# Patient Record
Sex: FEMALE | Race: WHITE | NOT HISPANIC OR LATINO | Employment: OTHER | URBAN - METROPOLITAN AREA
[De-identification: names, ages, dates, MRNs, and addresses within clinical notes are randomized per-mention and may not be internally consistent; named-entity substitution may affect disease eponyms.]

---

## 2017-01-12 ENCOUNTER — OFFICE VISIT (OUTPATIENT)
Dept: SURGERY | Facility: CLINIC | Age: 62
End: 2017-01-12

## 2017-01-12 VITALS
BODY MASS INDEX: 29.38 KG/M2 | OXYGEN SATURATION: 94 % | HEIGHT: 66 IN | TEMPERATURE: 98.5 F | HEART RATE: 89 BPM | DIASTOLIC BLOOD PRESSURE: 65 MMHG | SYSTOLIC BLOOD PRESSURE: 111 MMHG | WEIGHT: 182.8 LBS

## 2017-01-12 DIAGNOSIS — Z80.3 FH: BREAST CANCER: ICD-10-CM

## 2017-01-12 DIAGNOSIS — N60.19 DIFFUSE CYSTIC MASTOPATHY, UNSPECIFIED LATERALITY: ICD-10-CM

## 2017-01-12 DIAGNOSIS — D24.9 PAPILLOMA OF BREAST, UNSPECIFIED LATERALITY: ICD-10-CM

## 2017-01-12 DIAGNOSIS — R92.1 BREAST CALCIFICATIONS ON MAMMOGRAM: Primary | ICD-10-CM

## 2017-01-12 PROCEDURE — 99212 OFFICE O/P EST SF 10 MIN: CPT | Performed by: SURGERY

## 2017-01-12 NOTE — MR AVS SNAPSHOT
Kathreyn Andersen   1/12/2017 11:00 AM   Office Visit    Dept Phone:  [phone]   Encounter #:  [id number]    Provider:  Elba Urban MD   Department:  [facility] BREAST SURGERY                Your Full Care Plan              Your Updated Medication List          This list is accurate as of: 1/12/17 11:31 AM.  Always use your most recent med list.                ALPRAZolam 0.5 MG tablet   Commonly known as:  XANAX   take 1 tablet by mouth at bedtime       aspirin 81 MG tablet       azithromycin 250 MG tablet   Commonly known as:  ZITHROMAX Z-ADELINE   Take 2 tablets the first day, then 1 tablet daily for 4 days.       escitalopram 20 MG tablet   Commonly known as:  LEXAPRO   take 1 tablet by mouth once daily       ESTROGEL TD       FLONASE ALLERGY RELIEF 50 MCG/ACT nasal spray   Generic drug:  fluticasone       gabapentin 100 MG capsule   Commonly known as:  NEURONTIN   Take 2 capsules by mouth every night.       ibuprofen 400 MG tablet   Commonly known as:  ADVIL,MOTRIN       lisinopril-hydrochlorothiazide 10-12.5 MG per tablet   Commonly known as:  PRINZIDE,ZESTORETIC   take 1 tablet by mouth once daily       oxyCODONE-acetaminophen 5-325 MG per tablet   Commonly known as:  PERCOCET       promethazine-dextromethorphan 6.25-15 MG/5ML syrup   Commonly known as:  PROMETHAZINE-DM   Take 5 mL by mouth 4 (four) times a day as needed for cough.       simvastatin 40 MG tablet   Commonly known as:  ZOCOR   take 1 tablet by mouth at bedtime       traMADol 50 MG tablet   Commonly known as:  ULTRAM       traZODone 100 MG tablet   Commonly known as:  DESYREL   Take 1 tablet by mouth Every Night.       zolpidem 5 MG tablet   Commonly known as:  AMBIEN   take 1 tablet by mouth at bedtime if needed for sleep               You Were Diagnosed With        Codes Comments    Breast calcifications on mammogram    -  Primary ICD-10-CM: R92.1  ICD-9-CM: 793.89     Diffuse cystic mastopathy,  "unspecified laterality     ICD-10-CM: N60.19  ICD-9-CM: 610.1     Papilloma of breast, unspecified laterality     ICD-10-CM: D24.9  ICD-9-CM: 217     FH: breast cancer     ICD-10-CM: Z80.3  ICD-9-CM: V16.3       Instructions     None    Patient Instructions History      Upcoming Appointments     Visit Type Date Time Department    OFFICE VISIT 2017 11:00 AM [facility]    OFFICE VISIT 2017 10:30 AM [facility] Signup     [facility] [email or web address] allows you to send messages to your doctor, view your test results, renew your prescriptions, schedule appointments, and more. To sign up, go to [employer] and click on the Sign Up Now link in the New User? box. Enter your [email or web address] Activation Code exactly as it appears below along with the last four digits of your Social Security Number and your Date of Birth () to complete the sign-up process. If you do not sign up before the expiration date, you must request a new code.    [email or web address] Activation Code: [email or web address]  Expires: 2017 11:31 AM    If you have questions, you can email [email or web address] or call [phone] to talk to our [email or web address] staff. Remember, [email or web address] is NOT to be used for urgent needs. For medical emergencies, dial 911.               Other Info from Your Visit           Your Appointments     2017 10:30 AM EDT   Office Visit with Marcello Mills MD   [facility] (--)    [address]  [facility] [address] [phone]           Arrive 15 minutes prior to appointment.              Allergies     Other        Reason for Visit     Follow-up           Vital Signs     Blood Pressure Pulse Temperature Height    111/65 (BP Location: Left arm, Patient Position: Sitting, Cuff Size: Adult) 89 98.5 °F (36.9 °C) (Temporal Artery ) 66\" (167.6 cm)    Weight Oxygen Saturation Body Mass Index Smoking Status    182 lb 12.8 oz (82.9 kg) 94% 29.5 kg/m2 Never " Smoker      Problems and Diagnoses Noted     Breast calcification seen on mammogram    Painful lumpy breasts    FH: breast cancer    Benign tumor of breast

## 2017-01-12 NOTE — PROGRESS NOTES
Chief Complaint: Katheryn Andersen is a 61 y.o. female who was seen in consultation at the request of Armando Powell MD  for a followup visit    History of Present Illness:   Ms Andersen began having bilateral nipple discharge about one year ago.  She describes this as bilateral ,spontaneous, occurring intermittently, about once every 1-2 months.  SHe describes that the right discharge was clear or brown, and that the left was initially brown but then became frankly bloody.  She stopped her premarin about 4 weeks before her breast MRI.  SHe has had hot flashes since. She denies any masses, skin  changes, or nipple chages other than the discharge in either breast.  She denies any lumps in either axilla or in her neck.  Her  imaging after presentation included a bilateral screening mammogram 2/3/9 at [facility] that showed stable punctate calcifications with a diffuse scattered pattern, BR2.   Due to the nipple discharge, she then underwent a bilateral breast MRI 12/2/9 at [facility].  This showed in the LEFT breast a group of linear, clumped enhancement within the RA region (about 2.2 cm posterior to the nipple), spanning 2.5 cm diameter with no mammographic correlate. On the RIGHT, the MRI showed at 12:00 an about 1.4cm mass, 5CFN, superior to the nipple- resmbling a FA, but with some above threshold enhancement and rapid washout with recs for US. SHe had that ultrasound performed, which showed at the 12:00 4 CFN location a 9x5mm solid lesion, BR3.   SHe then undernwent an MRI guided biopsy 12/16/9 LEFT breast, the path from which returned as florid hyperplasia, papillomas, focal sclerosis, possibly representing a radial sclerosing lesion,and was felt to be concordant.  Her post biopsy mammogram showed a biopsy in the RA region. We then identified the RIGHT breast 12:00 lesion 5CFN on ultrasound and perfomred an US-guided breast biopsy of the solid lesion at the RIGHT12:00 location that returned as a  fibroadenoma.  2/4/10 I took Ms Andersen to the operating room for a needle localized excisional biopsy of the LEFT breast RA papilloma and radial sclerosing lesion.  The pathology from this returned as papilloma, usual hyperplasia, columnar cell change without atypia, sclerosing adenosis, focal radial scar, and microcysts.  She is here for her followup with no complaints except the expected discomfort at her incision. She denies any redness, warmth, or drainage from her wound.  She has a FH significant for colon cancer (see details below)-- she does have one maternal cousin with breast cancer diagnosed at age 36, who  from her breast cancer.  Mrs. Andersen postoperative bilateral mammogram was read as a BR2, with post operative changes on the LEFT and negative for findings on the RIGHT.      Mrs. Andersen's father passed away from compliactions of COPD. Her daugher who has Crohns disease is stable and is trying to get pregnant, and she has been trying to get her mother into a patio home now that her father has passed.    Mrs. Andersen mammogram from 3-15-11showed interval development of calcifications 6:00 RIGHT middle third slightly retroareolar location, rec stereo. LEFT breast no abnormal findings. I cancelled her 3-22 followup and scheduled her for a stereo biopsy in the interim with post biopsy visit. She is here today for that procedure.    Mrs. Andersen has done well since her RIGHT stereo biopsy on 11. She did have some post procedure bruising, but this is resolving.  She recently returned from [facility] from a vacation.   Her pathology returned as duct hyperplasia without atypia, CCC, apocrine metaplasia, focal adenosis, and microcyst.  Mrs. Andersen has been doing well since we last visited. No new masses, skin changes, nipple discharge either breast. She had reported RIGHT discharge in the past that was umpredictable, and infrequent, nonbloody. She has noted no further in the past 6 months.  Her most recent  imaging includes a 6 mo FU from RIGHT biopsy for benign disease:    [id number] Right Digital Diagnostic Mammography  Impression: Benign postbiopsy findings of the right breast.  BIRADS CATEGORY 2: Benign    Since our last visit, Katheryn tells me that she has started having RIGHT nipple drainage again. This time spontaneous. She has noted it 3 x in her bra, each time clear.   She has had no additional symtoms- no masses, skin changes in either breast and no drainage LEFT nipple.  Her most recent imaging includes   03-05-12 Screening mammogram bilateral E Katheryn Andersen  Impression: Stable benign mammogram.   BIRADS Category 1    She has had a significant weight gain over the past year- ~ 20 #.    Since our last visit, Katheryn had a needle biopsy of the RIGHT RA lesion that returned as papilloma, see below. She had moderate to significant bruising from that procedure.    On 5-31-12, I excised a papilloma RIGHT breast.  She has done well since that procedure and has note dno sugns of infection. Her pathology returned as papilloma and 2 radial sclerosing lesions as well as FCC and microcysts, see below.    Since our last visit, Mrs. Andersen has done well. She has noted no changes in her exam. No new masses, skin changes, nipple changes, nipple discharge .  Her most recent imaging includes  09-25-12         Right Breast Sonography          [facility]         Katheryn Andersen  Findings:  Sonographic evaluation of area of surgical excision of the papilla was performed.  corresponds  to the 12:00 o'clock  position  retroareolar.  At this location there is a 1. 5 x 1. 4 x 0. 9 cm  postoperative fluid collection.  BIRADS  Category  2:   Benign    She has started an a [place] diet to lose weight. Her weight today is stable from her last visit, at 180#. Her daughter is planning to start IVF soon.    Since our last visit, Mrs. Andersen has done well.   She has had no further drainage from either nipple.  No new  masses, skin changes, niple changes either breast.  Her daughter failed 2 tries with IUI and then her first trial of IVF.    Her most recent imaging includes the followin13      Digital Screening Mammogram     Katheryn Foreman     Stable fibroglandular pattern.   Scattered fibroglandular densities.   CONCLUSION: BIRADS CATEGORY 2: Benign     In the interim, Mrs. Andersen has done well.  She has noted no changes in her breast exam. No new masses, skin changes, niplpe changes, nipple discharge either breast.  Her most recenti maging includes the followin14      [facility]       Bilateral Mammography         Katheryn Andersen   There are scattered fibroglandular densities.   Finding 1: Stable punctate calcifications both breasts.   Finding 2: Isodense focal asymmetry left breast at 9 o'clock 7 centimeters from the nipple.   IMPRESSION:  Finding 1: Benign Negative   Finding 2:   BIRADS Category 0    14       [facility]       Left Diag Mammogram with Tomosynthesis        Katheryn Andersen   There are scatted fibroglandular densities.   Finding 1: Additional evaluation was performed for the focal asymmetry. Isodense focal asymmetry in the left breast at 10 o'clock located 7 centimeters from the nipple.   LEFT BREAST ULTRASOUND:  Finding 1: Oval elongated complex cyst 13 millimeters.   Finding 2: Oval elongated complicated cyst versus solid mass with circumscribed margins measuring 6 millimeters seen in the left breast at 9 o'clock.   IMPRESSION:   Finding 1: Complex cyst in the left breast is suspicious.   Finding 2: Complicated cyst versus solid mass in the left breast at 9 o'clock is suspicious.   BIRADS category 4A: Suspicious Abnormality    SHe then had the following biopsy at [facility]-    14       [facility]        Left Ultrasound Guided Biopsy          Katheryn Andersen   9:00 left breast.   12 gauge core needle device.   A total of 7 cores.   Minicork shaped s-francesca tissue marker was placed at the biopsy  site.   ADDENDUM-  9:00 left breast has returned intraductal papilloma.   The lesion appeared to be completely removed with the needle biopsy. Pathology is concordant.   Post clip mammogram demonstrates good position of the mini cork shaped clip. It is surrounded by a 2.2 cm mass consistent with a hematoma.     14       [facility]           Pathology Results             Katheryn Andersen   Diagnosis:   Intraductal papilloma with florid intraductal hyperplasia of the usual type.   Fibrofatty breast tissue also showing columnar cell hyperplasia, cystically dilated and ectatic ducts, apocrine metaplasia, and patchy mild chronic stromal inflammation.     She has her first new grandson, Tashi, who is 4 months. Her daughter had postpartum cardiomyopathy and has EF < 30% and has a defibrillator.     In the interim,  Mrs. Andersen  has done well.  She has noted no changes in her breast exam. No new masses, skin changes,  nipple changes, nipple discharge either breast.     Her most recent imaging includes the followin14          [facility]          LEFT BREAST ULTRASOUND           Katheryn Andersen  Finding 1: follow up focal asymmetry in the left breast, 10 o'clock seen is no evidence of any solid mass or abnormal cystic elements.   Finding 2: Followup complicated cyst versus solid mass left breast, 9 o'clock. 6 millimeters in the left breast at 9 o'clock. Mass is vascular. Does not persist.   Finding 3: There are two simple cysts seen in the left breast.   IMPRESSION:  Finding 1: Benign Negative  Finding 2: Benign Negative   Finding 3: Benign Negative   BIRADS Category 2: Benign     We ordered a LEFT mammogram, and we called [facility] when we received the report of an US. [facility] felt that an US was more appropriate- pt confirmed this today. Therefore, I did not have her return for mammogram.  She has stopped her estrace.      In the interim,  Mrs. Anedrsen  has done well.  She has noted no changes in her breast exam. No new masses, skin  changes,  nipple changes, nipple discharge either breast.     Her most recent imaging includes the followin/09/15         [facility]         Bilateral Screening Mammogram with Tomosynthesis          Katheryn Andersen  Yearly screening mammogram.  The breasts are heterogeneously dense.  There are new punctate calcifications with grouped distribution seen in the right breast at 5 o'clock.  IMPRESSION:  Birads Category 0    04/14/15          [facility]            RIGHT DIAGNOSTIC MAMMOGRAPHY         Katheryn Andersen  The breast is heterogeneously dense.   Finding 1: Additional evaluation calcifications in the right breast, 5 o'clock 9 millimeters with clustered distribution in the anterior one-third region of the right breast at 5 o'clock. Increased number of calcifications.   Finding 2: Multiple punctate and fine granular calcifications measuring 9 millimeters clustered middle one-third lower inner region of the right breast.   Finding 3: Multiple fine granular calcifications 9 millimeters clustered anterior one-third upper outer region of the right breast.   IMPRESSION;   Finding 1: Calcifications anterior one-third region right breast at 5 o'clock are suspicious.   Finding 2: Calcification middle one-third lower inner region right breast are suspicious.   Finding 3: Anterior one-third upper outer region of the right breast are probably benign. Follow up mammography in 6 months.   BIRADS category 4A    She then had the following biopsies:    04/16/15        [facility]                 Pathology report                   katheryn Andersen   DIAGNOSIS  1. Right breast tissue, 5:00, stereotactic biopsy;  benign usual duct hyperplasia and columnar cell hyperplasia with associated microcalcifications; Intraluminal microcalcifications; Aprocrine meta plasia; and cystically dilated and ectatic ducts.  2. Right breast tissue, lower inner quadrant, stereotactic biopsy:  benign columnar cell hyperplasia with associated microcalcifications; usual duct  "hyperplasia; sclerosing adenose with microcalcifications; ductal dilatation and ectasia; and apocrine metaplasia.     04/16/15         [facility]        Stereotactic Biopsy          Katheryn Andersen  12 core needle biopys specimens a 9 guage vacuum assisted device. A(n) hourglass shaped tri-francesca tissue marker was deplayed within the biopsy bed.  A follow up mammogram in 6 months is recommended.  ADDENDUM:  Right 6 month follow-up mammogram is recommended. Pathology is concordant.   Post-clip mammogram demonstrates good postition of the \"hour-glass\" shaped clip. The \"hour-glass\" shapped clip is the most anterior and medial. No significant hemotoma formation.     04/16/15        [facility]        Stereotactic Biopsy          Katheryn Andersen  Right breast, 12 core needle biopsy specimens a 9 gauge vacuum assisted device. A top hat shaped s-francesca eviva tissue marker was deployed within the biopsy bed.   A follow up mammogram in 6 month is recommended.   ADDENDUM:  Pathology is concordant. The clip from this biopsy is a \"top-hat\" shaped clip. The \"top-hat\" shaped clip is deep to the nipple and the most posterior of the four clips. \"Top-hat\" shaped clip is the most posterior and the most inferior of the four clips.    She reports bruising RIGHT breast.      In the interim,  Mrs. Andersen has done well.  She has noted no changes in her breast exam. No new masses, skin changes,  nipple changes, nipple discharge either breast.       Her most recent imaging includes the following:  10/14/15        [facility]     Right Breast Diagnostic Mammogram with Tomosynthesis                              Katheryn Andersen  The breast is heterogeneously dense.  Finding 1:  Follow-up calcifications in the right breast,5 o'clock.   in an area of prior needle biopsy. a decreased number of calcifications.  Finding 2:  Follow-up calcifications in the right breast,lower inner   few stable punctate and very faint calcifications in the middle one-third " lower inner region of the right breast..  in an area of prior needle biopsy. decreased number of calcifications.  Finding 3:  Follow-up  calcifications in the right breast, upper outer. grouped distribution in the anterior one-third upper outer region of the right breast.  These demonstrate morphology similar to the groups which were biopsied.  There has been no significant change.  IMPRESSION:  Finding 1:  Benign-negative.  Finding 2:  Benign-negative  Finding 3:  Stable punctate calcifications in the anterior one-third upper outer region of the right breast are probably benign.  A 6 month follow-up diagnostic mammogram is recommended.  BI-RADS Category 3- Probably benign    She has gained 7#, at 185 today.    In the interim,  Katheryn Andersen  has done well.  She has noted no changes in her breast exam. No new masses, skin changes, nipple changes, nipple discharge either breast.     Her most recent imaging includes the following:  [facility].  The breast are heterogenous leg dense.  Follow-up right upper outer breast calcifications seen in the anterior to posterior third, no suspicious forms, no change.  Stable calcifications central right breast.  BI-RADS 3 for 6 month mammogram.      Interval History:  In the interim,  Katheryn Andersen  has done well.  She has noted no changes in her breast exam. No new masses, skin changes, nipple changes, nipple discharge either breast.   She denies headache, bone pain, belly pain, cough, changes in vision or gait.  Her most recent imaging includes the following:  [facility] October [facility] 25th 2016.  Right diagnostic mammogram with 3-D.  Follow up calcifications in the right breast, upper outer.  No suspicious forms.  No significant change.  Right breast ultrasound was benign.  BI-RADS 2.      Review of Systems:  Review of Systems   Constitutional: Negative for unexpected weight change (7 lb weight  loss).   All other systems reviewed and are negative.       Past Medical and Surgical History:  Breast Biopsy History:  Patient has had the following breast biopsies:She had one on the LEFT side 12/16/9. The pathology from this biopsy was papilloma, radial sclerosing lesion.  Breast Cancer HIstory:  Patient does not have a past medical history of breast cancer.  Breast Operations, and year:  None   Obstetric/Gynecologic History:  Age menstrual periods began:13  Patient is postmenopausal due to removal of her uterus in the following year: 2005   Number of pregnancies:2  Number of live births: 2  Number of abortions or miscarriages: 0  Age of delivery of first child: 28  Patient did not breast feed.  Length of time taking birth control pills:12 yrs   Patient took hormone replacement during the following dates: 3 yrs     Past Surgical History   Procedure Laterality Date   • Hysterectomy     • Foot arthroplasty     • Refractive surgery         Past Medical History   Diagnosis Date   • Hyperlipidemia    • Hypertension    • Insomnia        Prior Hospitalizations, other than for surgery or childbirth, and year:  n/a      Social History     Social History   • Marital status: Single     Spouse name: N/A   • Number of children: N/A   • Years of education: N/A     Occupational History   • Not on file.     Social History Main Topics   • Smoking status: Never Smoker   • Smokeless tobacco: Never Used   • Alcohol use Yes      Comment: social   • Drug use: No   • Sexual activity: Defer     Other Topics Concern   • Not on file     Social History Narrative     Patient is .  Patient is a homemaker.  Patient drinks 1 servings of caffeine per day.      Family History:  Family History   Problem Relation Age of Onset   • Diabetes Mother    • Hypertension Mother    • Hyperlipidemia Mother    • COPD Father    • Hypertension Father    • Hyperlipidemia Father    • Diabetes Sister    • Mental retardation Sister    • Stroke Maternal  "Grandmother    • Stroke Paternal Grandmother    • Diabetes Paternal Grandmother    • Breast cancer Cousin 36     MATERNAL 1ST COUSIN    • Colon cancer Maternal Uncle      70S   • Colon cancer Maternal Uncle      70S   • Colon cancer Cousin      MATERNAL COUSIN   • Colon cancer Cousin      MATERNAL COUSIN   • Colon cancer Cousin      MATERNAL COUSIN       Vital Signs:  Visit Vitals   • /65 (BP Location: Left arm, Patient Position: Sitting, Cuff Size: Adult)   • Pulse 89   • Temp 98.5 °F (36.9 °C) (Temporal Artery )   • Ht 66\" (167.6 cm)   • Wt 182 lb 12.8 oz (82.9 kg)   • SpO2 94%   • BMI 29.5 kg/m2        Medications:    Current Outpatient Prescriptions:   •  ALPRAZolam (XANAX) 0.5 MG tablet, take 1 tablet by mouth at bedtime, Disp: 30 tablet, Rfl: 0  •  aspirin 81 MG tablet, Take by mouth daily., Disp: , Rfl:   •  escitalopram (LEXAPRO) 20 MG tablet, take 1 tablet by mouth once daily, Disp: 30 tablet, Rfl: 5  •  Estradiol (ESTROGEL TD), Place  on the skin., Disp: , Rfl:   •  FLONASE ALLERGY RELIEF 50 MCG/ACT nasal spray, instill 2 sprays into each nostril once daily, Disp: , Rfl: 0  •  gabapentin (NEURONTIN) 100 MG capsule, Take 2 capsules by mouth every night., Disp: 60 capsule, Rfl: 1  •  ibuprofen (ADVIL,MOTRIN) 400 MG tablet, , Disp: , Rfl: 0  •  lisinopril-hydrochlorothiazide (PRINZIDE,ZESTORETIC) 10-12.5 MG per tablet, take 1 tablet by mouth once daily, Disp: 30 tablet, Rfl: 5  •  simvastatin (ZOCOR) 40 MG tablet, take 1 tablet by mouth at bedtime, Disp: 30 tablet, Rfl: 5  •  traZODone (DESYREL) 100 MG tablet, Take 1 tablet by mouth Every Night., Disp: 90 tablet, Rfl: 1  •  zolpidem (AMBIEN) 5 MG tablet, take 1 tablet by mouth at bedtime if needed for sleep, Disp: 30 tablet, Rfl: 3  •  azithromycin (ZITHROMAX Z-ADELINE) 250 MG tablet, Take 2 tablets the first day, then 1 tablet daily for 4 days., Disp: 6 tablet, Rfl: 0  •  oxyCODONE-acetaminophen (PERCOCET) 5-325 MG per tablet, take 1 to 2 tablets by mouth " every 4 hours if needed for pain, Disp: , Rfl: 0  •  promethazine-dextromethorphan (PROMETHAZINE-DM) 6.25-15 MG/5ML syrup, Take 5 mL by mouth 4 (four) times a day as needed for cough., Disp: 180 mL, Rfl: 0  •  traMADol (ULTRAM) 50 MG tablet, take 1 to 2 tablets by mouth every 6 hours if needed for pain, Disp: , Rfl: 0     Allergies:  Allergies   Allergen Reactions   • Other        Physical Examination:  General Appearance:  Patient is in no distress.  She is well kept and has an  overweight  build.   Psychiatric:  Patient with appropriate mood and affect. Alert and oriented to self, time, and place.    Breast, RIGHT:  medium  sized, symmetric with the contralateral side.  Breast skin is without erythema, edema, rashes bilaterally.  There are no visible abnormalities upon inspection during the arm-raising maneuver or with hands on hips in the sitting position. There is no nipple retraction or nipple/areolar skin changes. There is a well healed periareolar incision 12:00 and UIQ from 5-2012 excision of papilloma on core. There are no masses palpable in the sitting or supine positions.      Breast, LEFT:   medium  sized, symmetric with the contralateral side.  Breast skin is without erythema, edema, rashes bilaterally.  She has a well healed curvilinear incision at the upper inner periareolar location 9-12:00.  There is no nipple retraction, no discharge or nipple/areolar skin changes.  There are no masses palpable in the sitting or supine positions.     Lymphatic:  There is no axillary, cervical, infraclavicular, or supraclavicular adenopathy bilaterally.  Eyes:  Pupils are round and reactive to light.  Cardiovascular:  Heart rate and rhythm are regular.  Respiratory:  Lungs are clear bilaterally with no crackles or wheezes in any lung field.  Gastrointestinal:  Abdomen is soft, nondistended, and nontender. There are no scars from previous surgery.   Musculoskeletal:  Good strength in all 4 extremities.  There is good  range of motion in both shoulders.   Skin:  No new skin lesions or rashes on the skin excluding the breast (see breast exam above).          Imaging:   3-15-11[facility] mammogram bilateral- showed interval development of calcifications 6:00 RIGHT middle third slightly retroareolar location, rec stereo. LEFT breast no abnormal findings.     [id number] Right Digital Diagnostic Mammography [facility]  Impression: Benign postbiopsy findings of the right breast.  BIRADS CATEGORY 2: Benign    03-05-12 Screening mammogram bilateral [facility] Katheryn Andersen  Impression: Stable benign mammogram.   BIRADS Category 1    03-13-12 Right breast sonography [facility] Katheryn Andersen  Impression: 0.7 cm retroareolar right breast mass at the 12 o'clock position that has features suggestive of an intraductal location. This may represent a papilloma.   BIRADS Category 4    09-25-12         Right Breast Sonography          [facility] [place]         Katheryn Andersen  Findings:  Sonographic evaluation of area of surgical excision of the papilla was performed.  corresponds  to the 12:00 o'clock  position  retroareolar.  At this location there is a 1. 5 x 1. 4 x 0. 9 cm  postoperative fluid collection.  BIRADS  Category  2:   Benign    03/06/13      Digital Screening Mammogram     [facility]       Katheryn Andersen   Stable fibroglandular pattern.   Scattered fibroglandular densities.   CONCLUSION: BIRADS CATEGORY 2: Benign     04/07/14      [facility]       Bilateral Mammography         [facility]   There are scattered fibroglandular densities.   Finding 1: Stable punctate calcifications both breasts.   Finding 2: Isodense focal asymmetry left breast at 9 o'clock 7 centimeters from the nipple.   IMPRESSION:  Finding 1: Benign Negative   Finding 2:   BIRADS Category 0    04/21/14       [facility]       Left Diag Mammogram with Tomosynthesis        Katheryn Andersen   There are scatted fibroglandular densities.   Finding 1: Additional evaluation was performed for the  focal asymmetry. Isodense focal asymmetry in the left breast at 10 o'clock located 7 centimeters from the nipple.   LEFT BREAST ULTRASOUND:  Finding 1: Oval elongated complex cyst 13 millimeters.   Finding 2: Oval elongated complicated cyst versus solid mass with circumscribed margins measuring 6 millimeters seen in the left breast at 9 o'clock.   IMPRESSION:   Finding 1: Complex cyst in the left breast is suspicious.   Finding 2: Complicated cyst versus solid mass in the left breast at 9 o'clock is suspicious.   BIRADS category 4A: Suspicious Abnormality    12/18/14          [facility]          [facility] BREAST ULTRASOUND           [facility]  Finding 1: follow up focal asymmetry in the left breast, 10 o'clock seen is no evidence of any solid mass or abnormal cystic elements.   Finding 2: Followup complicated cyst versus solid mass left breast, 9 o'clock. 6 millimeters in the left breast at 9 o'clock. Mass is vascular. Does not persist.   Finding 3: There are two simple cysts seen in the left breast.   IMPRESSION:  Finding 1: Benign Negative  Finding 2: Benign Negative   Finding 3: Benign Negative   BIRADS Category 2: Benign     04/09/15         [facility]         Bilateral Screening Mammogram with Tomosynthesis          Katheryn Andersen  Yearly screening mammogram.  The breasts are heterogeneously dense.  There are new punctate calcifications with grouped distribution seen in the right breast at 5 o'clock.  IMPRESSION:  Birads Category 0    04/14/15          [facility]            RIGHT DIAGNOSTIC MAMMOGRAPHY         Katheryn Andersen  The breast is heterogeneously dense.   Finding 1: Additional evaluation calcifications in the right breast, 5 o'clock 9 millimeters with clustered distribution in the anterior one-third region of the right breast at 5 o'clock. Increased number of calcifications.   Finding 2: Multiple punctate and fine granular calcifications measuring 9 millimeters clustered middle one-third lower inner region of the right  breast.   Finding 3: Multiple fine granular calcifications 9 millimeters clustered anterior one-third upper outer region of the right breast.   IMPRESSION;   Finding 1: Calcifications anterior one-third region right breast at 5 o'clock are suspicious.   Finding 2: Calcification middle one-third lower inner region right breast are suspicious.   Finding 3: Anterior one-third upper outer region of the right breast are probably benign. Follow up mammography in 6 months.   BIRADS category 4A    10/14/15        [facility]     Right Breast Diagnostic Mammogram with Tomosynthesis                              Katheryn Eberenz  The breast is heterogeneously dense.  Finding 1:  Follow-up calcifications in the right breast,5 o'clock.   in an area of prior needle biopsy. a decreased number of calcifications.  Finding 2:  Follow-up calcifications in the right breast,lower inner   few stable punctate and very faint calcifications in the middle one-third lower inner region of the right breast..  in an area of prior needle biopsy. decreased number of calcifications.  Finding 3:  Follow-up  calcifications in the right breast, upper outer. grouped distribution in the anterior one-third upper outer region of the right breast.  These demonstrate morphology similar to the groups which were biopsied.  There has been no significant change.  IMPRESSION:  Finding 1:  Benign-negative.  Finding 2:  Benign-negative  Finding 3:  Stable punctate calcifications in the anterior one-third upper outer region of the right breast are probably benign.  A 6 month follow-up diagnostic mammogram is recommended.  BI-RADS Category 3- Probably benign    10/14/15        [facility]     Right Breast Diagnostic Mammogram with Tomosynthesis                              Katheryn Eberenz  The breast is heterogeneously dense.  Finding 1:  Follow-up calcifications in the right breast,5 o'clock.   in an area of prior needle biopsy. a decreased  number of calcifications.  Finding 2:  Follow-up calcifications in the right breast,lower inner   few stable punctate and very faint calcifications in the middle one-third lower inner region of the right breast..  in an area of prior needle biopsy. decreased number of calcifications.  Finding 3:  Follow-up  calcifications in the right breast, upper outer. grouped distribution in the anterior one-third upper outer region of the right breast.  These demonstrate morphology similar to the groups which were biopsied.  There has been no significant change.  IMPRESSION:  Finding 1:  Benign-negative.  Finding 2:  Benign-negative  Finding 3:  Stable punctate calcifications in the anterior one-third upper outer region of the right breast are probably benign.  A 6 month follow-up diagnostic mammogram is recommended.  BI-RADS Category 3- Probably benign    04-15-16                    [facility]                 BILATERIAL DIAGNOSTIC MAMMOGRAM            JOSE A BORREGO  BILATERAL DIAGNOSTIC MAMMOGRAM WITH TOMOSYNTHESIS  The breasts are heterogeneously dense.  Finding 1: Calcifications in the right breast upper anterior thirds of the breast. No suspicious forms are seen no significant change.  Finding 2. Stable calcifications seen region of the right breast.  IMPRESSIONS:  Finding 1. Stable probably benign. Follow-up mammography in 6 months is recommended. Follow-up ultrasound of the right breast in 6 months is recommended.  Finding 2. Benign- negative  BI-RADS Category 3: Probably Benign    10-25-16                          [facility] RIGHT DIAG MAMMO WITH TOMOSYNTHESIS              JOSE A BORREGO  The breast is heterogeneously dense. Follow-up calcifications right breast upper outer stable no significant change.  RIGHT BREAST ULTRASOUND  Follow-up calcifications in the right breast upper outer. There is no evidence of any solid mass or abnormal cystic elements.  IMPRESSION:  Benign-Negative  BI-RADS Category 2:  Benign    Procedures:      Assessment:   Diagnosis Plan   1. Breast calcifications on mammogram  Mammo Screening Digital Tomosynthesis Bilateral With CAD   2. Diffuse cystic mastopathy, unspecified laterality  Mammo Screening Digital Tomosynthesis Bilateral With CAD   3. Papilloma of breast, unspecified laterality  Mammo Screening Digital Tomosynthesis Bilateral With CAD   4. FH: breast cancer  Mammo Screening Digital Tomosynthesis Bilateral With CAD     1-  RIGHT ANTERIOR TO POSTERIOR THIRD UOQ 9mm, right central - BR3 in 4-2015,  , and April 2016- BR2 in     2-  -RIGHT breast 5:00 calcifications- anterior 1/3 9mm- stereo 4-2015- usual hyperplasia, CCC, apocrine metasplasia and dilated ducts- hourglass marker  RIGHT breast LIQ, CCC, usual hyperplasia, sclerosing adenosis, and apocrine metasplasia- tophat marker    -RIGHT breast, central breast, 6:00, BR4 on 3-15-11mammogram; post stereo 4-4-11 benign proliferative and FCC with calcifications. 6 mo FU imaging benign 9-2011    3-  -LEFT breast papilloma 9:00- core at [facility]  4-2014 with complete removal,  Noexcision. LEFT US benign .    -  LEFT breast papilloma, focal radial scar.  Workup triggered by LEFT bloody nipple discharge, which prompted an MRI - this demonstrated a lesion which was biopsied and lead to an excisional biopsy. Exicisonal biopsy showed papilloma, columnar change and radial scar, no atypia. Followup imaging benign and nipple discharge resolved.      4-  Maternal cousin age 36- this cousin's mother (not of blood relation to Katheryn- had breast cancer and had BRCA testing that showed indeterminate variant per Katheryn's report; this cousin's father is Katheryn's blood uncle)      Plan:  Mrs. Aguilar is doing well today at her interval follow-up visit for the microcalcifications in the right breast.  We reviewed her interval history and imaging with reports.  Her examination is in good order.    [facility]  We will arrange that today and see her back after.    I asked her to continue her self breast exam and to call us in the interim with any concerns or changes.      Elba Urban MD              Next Appointment:  Return for Next scheduled follow up, after imaging.

## 2017-01-12 NOTE — LETTER
January 13, 2017     Armando Powell MD  [id number] [facility] [id number]    Patient: Katheryn Andersen   YOB: 1955   Date of Visit: 1/12/2017       Dear Dr. Andre MD:    Thank you for referring Katheryn Andersen to me for evaluation. Below are the relevant portions of my assessment and plan of care.    If you have questions, please do not hesitate to call me. I look forward to following Katheryn along with you.         Sincerely,        Elba Urban MD        CC: MD Lee Ann Rodriguez APRN Allison Hatmaker, MD  1/12/2017 11:28 AM  Signed  Chief Complaint: Katheryn Andersen is a 61 y.o. female who was seen in consultation at the request of Armando Powell MD  for a followup visit    History of Present Illness:   Ms Andersen began having bilateral nipple discharge about one year ago.  She describes this as bilateral ,spontaneous, occurring intermittently, about once every 1-2 months.  SHe describes that the right discharge was clear or brown, and that the left was initially brown but then became frankly bloody.  She stopped her premarin about 4 weeks before her breast MRI.  SHe has had hot flashes since. She denies any masses, skin  changes, or nipple chages other than the discharge in either breast.  She denies any lumps in either axilla or in her neck.  Her  imaging after presentation included a bilateral screening mammogram 2/3/9 at [facility] that showed stable punctate calcifications with a diffuse scattered pattern, BR2.   Due to the nipple discharge, she then underwent a bilateral breast MRI 12/2/9 at [facility].  This showed in the LEFT breast a group of linear, clumped enhancement within the RA region (about 2.2 cm posterior to the nipple), spanning 2.5 cm diameter with no mammographic correlate. On the RIGHT, the MRI showed at 12:00 an about 1.4cm mass, 5CFN, superior to the nipple- resmbling a FA, but with some above threshold enhancement and  rapid washout with recs for US. SHe had that ultrasound performed, which showed at the 12:00 4 CFN location a 9x5mm solid lesion, BR3.   SHe then undernwent an MRI guided biopsy  LEFT breast, the path from which returned as florid hyperplasia, papillomas, focal sclerosis, possibly representing a radial sclerosing lesion,and was felt to be concordant.  Her post biopsy mammogram showed a biopsy in the RA region. We then identified the RIGHT breast 12:00 lesion 5CFN on ultrasound and perfomred an US-guided breast biopsy of the solid lesion at the RIGHT12:00 location that returned as a fibroadenoma.  2/4/10 I took Ms Andersen to the operating room for a needle localized excisional biopsy of the LEFT breast RA papilloma and radial sclerosing lesion.  The pathology from this returned as papilloma, usual hyperplasia, columnar cell change without atypia, sclerosing adenosis, focal radial scar, and microcysts.  She is here for her followup with no complaints except the expected discomfort at her incision. She denies any redness, warmth, or drainage from her wound.  She has a FH significant for colon cancer (see details below)-- she does have one maternal cousin with breast cancer diagnosed at age 36, who  from her breast cancer.  Mrs. Andersen postoperative bilateral mammogram was read as a BR2, with post operative changes on the LEFT and negative for findings on the RIGHT.      Mrs. Andersen's father passed away from compliactions of COPD. Her daugher who has Crohns disease is stable and is trying to get pregnant, and she has been trying to get her mother into a patio home now that her father has passed.    Mrs. Andersen mammogram from 3-15-showed interval development of calcifications 6:00 RIGHT middle third slightly retroareolar location, rec stereo. LEFT breast no abnormal findings. I cancelled her 3-22 followup and scheduled her for a stereo biopsy in the interim with post biopsy visit. She is here today for  that procedure.    Mrs. Andersen has done well since her RIGHT stereo biopsy on 4-4-11. She did have some post procedure bruising, but this is resolving.  She recently returned from [facility] from a vacation.   Her pathology returned as duct hyperplasia without atypia, CCC, apocrine metaplasia, focal adenosis, and microcyst.  Mrs. Andersen has been doing well since we last visited. No new masses, skin changes, nipple discharge either breast. She had reported RIGHT discharge in the past that was umpredictable, and infrequent, nonbloody. She has noted no further in the past 6 months.  Her most recent imaging includes a 6 mo FU from RIGHT biopsy for benign disease:    [id number] Right Digital Diagnostic Mammography  Impression: Benign postbiopsy findings of the right breast.  BIRADS CATEGORY 2: Benign    Since our last visit, Katheryn tells me that she has started having RIGHT nipple drainage again. This time spontaneous. She has noted it 3 x in her bra, each time clear.   She has had no additional symtoms- no masses, skin changes in either breast and no drainage LEFT nipple.  Her most recent imaging includes   03-05-12 Screening mammogram bilateral Katheryn Foreman  Impression: Stable benign mammogram.   BIRADS Category 1    She has had a significant weight gain over the past year- ~ 20 #.    Since our last visit, Katheryn had a needle biopsy of the RIGHT RA lesion that returned as papilloma, see below. She had moderate to significant bruising from that procedure.    On 5-31-12, I excised a papilloma RIGHT breast.  She has done well since that procedure and has note dno sugns of infection. Her pathology returned as papilloma and 2 radial sclerosing lesions as well as FCC and microcysts, see below.    Since our last visit, Mrs. Andersen has done well. She has noted no changes in her exam. No new masses, skin changes, nipple changes, nipple discharge .  Her most recent imaging includes  09-25-12         Right Breast  Sonography          [facility]         Katheryn Andersen  Findings:  Sonographic evaluation of area of surgical excision of the papilla was performed.  corresponds  to the 12:00 o'clock  position  retroareolar.  At this location there is a 1. 5 x 1. 4 x 0. 9 cm  postoperative fluid collection.  BIRADS  Category  2:   Benign    She has started an a [place] diet to lose weight. Her weight today is stable from her last visit, at 180#. Her daughter is planning to start IVF soon.    Since our last visit, Mrs. Andersen has done well.   She has had no further drainage from either nipple.  No new masses, skin changes, niple changes either breast.  Her daughter failed 2 tries with IUI and then her first trial of IVF.    Her most recent imaging includes the followin13      Digital Screening Mammogram     COURT       GaneshKatheryn     Stable fibroglandular pattern.   Scattered fibroglandular densities.   CONCLUSION: BIRADS CATEGORY 2: Benign     In the interim, Mrs. Andersen has done well.  She has noted no changes in her breast exam. No new masses, skin changes, niplpe changes, nipple discharge either breast.  Her most recenti maging includes the followin14      [facility]       Bilateral Mammography         Katheryn Andersen   There are scattered fibroglandular densities.   Finding 1: Stable punctate calcifications both breasts.   Finding 2: Isodense focal asymmetry left breast at 9 o'clock 7 centimeters from the nipple.   IMPRESSION:  Finding 1: Benign Negative   Finding 2:   BIRADS Category 0    14       [facility]       Left Diag Mammogram with Tomosynthesis        Katheryn Andersen   There are scatted fibroglandular densities.   Finding 1: Additional evaluation was performed for the focal asymmetry. Isodense focal asymmetry in the left breast at 10 o'clock located 7 centimeters from the nipple.   LEFT BREAST ULTRASOUND:  Finding 1: Oval elongated complex cyst 13 millimeters.   Finding 2: Oval  elongated complicated cyst versus solid mass with circumscribed margins measuring 6 millimeters seen in the left breast at 9 o'clock.   IMPRESSION:   Finding 1: Complex cyst in the left breast is suspicious.   Finding 2: Complicated cyst versus solid mass in the left breast at 9 o'clock is suspicious.   BIRADS category 4A: Suspicious Abnormality    SHe then had the following biopsy at [facility]-    14       [facility]        Left Ultrasound Guided Biopsy          Katheryn Andersen   9:00 left breast.   12 gauge core needle device.   A total of 7 cores.   Minicork shaped s-francesca tissue marker was placed at the biopsy site.   ADDENDUM-  9:00 left breast has returned intraductal papilloma.   The lesion appeared to be completely removed with the needle biopsy. Pathology is concordant.   Post clip mammogram demonstrates good position of the mini cork shaped clip. It is surrounded by a 2.2 cm mass consistent with a hematoma.     14       [facility]           Pathology Results             Katheryn Andersen   Diagnosis:   Intraductal papilloma with florid intraductal hyperplasia of the usual type.   Fibrofatty breast tissue also showing columnar cell hyperplasia, cystically dilated and ectatic ducts, apocrine metaplasia, and patchy mild chronic stromal inflammation.     She has her first new grandson, Tashi, who is 4 months. Her daughter had postpartum cardiomyopathy and has EF < 30% and has a defibrillator.     In the interim,  Mrs. Andersen  has done well.  She has noted no changes in her breast exam. No new masses, skin changes,  nipple changes, nipple discharge either breast.     Her most recent imaging includes the followin14          [facility]          LEFT BREAST ULTRASOUND           Katheryn Ganesh  Finding 1: follow up focal asymmetry in the left breast, 10 o'clock seen is no evidence of any solid mass or abnormal cystic elements.   Finding 2: Followup complicated cyst versus solid mass left breast, 9 o'clock. 6 millimeters in  the left breast at 9 o'clock. Mass is vascular. Does not persist.   Finding 3: There are two simple cysts seen in the left breast.   IMPRESSION:  Finding 1: Benign Negative  Finding 2: Benign Negative   Finding 3: Benign Negative   BIRADS Category 2: Benign     We ordered a LEFT mammogram, and we called [facility] when we received the report of an US. [facility] felt that an US was more appropriate- pt confirmed this today. Therefore, I did not have her return for mammogram.  She has stopped her estrace.      In the interim,  Mrs. Andersen  has done well.  She has noted no changes in her breast exam. No new masses, skin changes,  nipple changes, nipple discharge either breast.     Her most recent imaging includes the followin/09/15         [facility]         Bilateral Screening Mammogram with Tomosynthesis          Lorettapauladonny Katheryn  Yearly screening mammogram.  The breasts are heterogeneously dense.  There are new punctate calcifications with grouped distribution seen in the right breast at 5 o'clock.  IMPRESSION:  Birads Category 0    04/14/15          [facility]            RIGHT DIAGNOSTIC MAMMOGRAPHY         Katheryn Andersen  The breast is heterogeneously dense.   Finding 1: Additional evaluation calcifications in the right breast, 5 o'clock 9 millimeters with clustered distribution in the anterior one-third region of the right breast at 5 o'clock. Increased number of calcifications.   Finding 2: Multiple punctate and fine granular calcifications measuring 9 millimeters clustered middle one-third lower inner region of the right breast.   Finding 3: Multiple fine granular calcifications 9 millimeters clustered anterior one-third upper outer region of the right breast.   IMPRESSION;   Finding 1: Calcifications anterior one-third region right breast at 5 o'clock are suspicious.   Finding 2: Calcification middle one-third lower inner region right breast are suspicious.   Finding 3: Anterior one-third upper outer region of the right breast are  "probably benign. Follow up mammography in 6 months.   BIRADS category 4A    She then had the following biopsies:    04/16/15        [facility]                 Pathology report                   katheryn Andersen   DIAGNOSIS  1. Right breast tissue, 5:00, stereotactic biopsy;  benign usual duct hyperplasia and columnar cell hyperplasia with associated microcalcifications; Intraluminal microcalcifications; Aprocrine meta plasia; and cystically dilated and ectatic ducts.  2. Right breast tissue, lower inner quadrant, stereotactic biopsy:  benign columnar cell hyperplasia with associated microcalcifications; usual duct hyperplasia; sclerosing adenose with microcalcifications; ductal dilatation and ectasia; and apocrine metaplasia.     04/16/15         [facility]        Stereotactic Biopsy          Katheryn Andersen  12 core needle biopys specimens a 9 guage vacuum assisted device. A(n) hourglass shaped tri-francesca tissue marker was deplayed within the biopsy bed.  A follow up mammogram in 6 months is recommended.  ADDENDUM:  Right 6 month follow-up mammogram is recommended. Pathology is concordant.   Post-clip mammogram demonstrates good postition of the \"hour-glass\" shaped clip. The \"hour-glass\" shapped clip is the most anterior and medial. No significant hemotoma formation.     04/16/15        [facility]        Stereotactic Biopsy          Katheryn Andersen  Right breast, 12 core needle biopsy specimens a 9 gauge vacuum assisted device. A top hat shaped s-francesca eviva tissue marker was deployed within the biopsy bed.   A follow up mammogram in 6 month is recommended.   ADDENDUM:  Pathology is concordant. The clip from this biopsy is a \"top-hat\" shaped clip. The \"top-hat\" shaped clip is deep to the nipple and the most posterior of the four clips. \"Top-hat\" shaped clip is the most posterior and the most inferior of the four clips.    She reports bruising RIGHT breast.      In the interim,  Mrs. Andersen has done well.  She has noted no changes in her " breast exam. No new masses, skin changes,  nipple changes, nipple discharge either breast.       Her most recent imaging includes the following:  10/14/15        [facility]     Right Breast Diagnostic Mammogram with Tomosynthesis                              Katheryn Andersen  The breast is heterogeneously dense.  Finding 1:  Follow-up calcifications in the right breast,5 o'clock.   in an area of prior needle biopsy. a decreased number of calcifications.  Finding 2:  Follow-up calcifications in the right breast,lower inner   few stable punctate and very faint calcifications in the middle one-third lower inner region of the right breast..  in an area of prior needle biopsy. decreased number of calcifications.  Finding 3:  Follow-up  calcifications in the right breast, upper outer. grouped distribution in the anterior one-third upper outer region of the right breast.  These demonstrate morphology similar to the groups which were biopsied.  There has been no significant change.  IMPRESSION:  Finding 1:  Benign-negative.  Finding 2:  Benign-negative  Finding 3:  Stable punctate calcifications in the anterior one-third upper outer region of the right breast are probably benign.  A 6 month follow-up diagnostic mammogram is recommended.  BI-RADS Category 3- Probably benign    She has gained 7#, at 185 today.    In the interim,  Katheryn Burgospauladonny  has done well.  She has noted no changes in her breast exam. No new masses, skin changes, nipple changes, nipple discharge either breast.     Her most recent imaging includes the following:  April 15, 2016 [facility] bilateral diagnostic mammogram with 3-D.  The breast are heterogenous leg dense.  Follow-up right upper outer breast calcifications seen in the anterior to posterior third, no suspicious forms, no change.  Stable calcifications central right breast.  BI-RADS 3 for 6 month mammogram.      Interval History:  In the interim,  Katheryn Ganesh  has  done well.  She has noted no changes in her breast exam. No new masses, skin changes, nipple changes, nipple discharge either breast.   She denies headache, bone pain, belly pain, cough, changes in vision or gait.  Her most recent imaging includes the following:  [facility] October the 25th 2016.  Right diagnostic mammogram with 3-D.  Follow up calcifications in the right breast, upper outer.  No suspicious forms.  No significant change.  Right breast ultrasound was benign.  BI-RADS 2.      Review of Systems:  Review of Systems   Constitutional: Negative for unexpected weight change (7 lb weight loss).   All other systems reviewed and are negative.       Past Medical and Surgical History:  Breast Biopsy History:  Patient has had the following breast biopsies:She had one on the LEFT side 12/16/9. The pathology from this biopsy was papilloma, radial sclerosing lesion.  Breast Cancer HIstory:  Patient does not have a past medical history of breast cancer.  Breast Operations, and year:  None   Obstetric/Gynecologic History:  Age menstrual periods began:13  Patient is postmenopausal due to removal of her uterus in the following year: 2005   Number of pregnancies:2  Number of live births: 2  Number of abortions or miscarriages: 0  Age of delivery of first child: 28  Patient did not breast feed.  Length of time taking birth control pills:12 yrs   Patient took hormone replacement during the following dates: 3 yrs     Past Surgical History   Procedure Laterality Date   • Hysterectomy     • Foot arthroplasty     • Refractive surgery         Past Medical History   Diagnosis Date   • Hyperlipidemia    • Hypertension    • Insomnia        Prior Hospitalizations, other than for surgery or childbirth, and year:  n/a      Social History     Social History   • Marital status: Single     Spouse name: N/A   • Number of children: N/A   • Years of education: N/A     Occupational History   • Not on file.     Social History  "Main Topics   • Smoking status: Never Smoker   • Smokeless tobacco: Never Used   • Alcohol use Yes      Comment: social   • Drug use: No   • Sexual activity: Defer     Other Topics Concern   • Not on file     Social History Narrative     Patient is .  Patient is a homemaker.  Patient drinks 1 servings of caffeine per day.      Family History:  Family History   Problem Relation Age of Onset   • Diabetes Mother    • Hypertension Mother    • Hyperlipidemia Mother    • COPD Father    • Hypertension Father    • Hyperlipidemia Father    • Diabetes Sister    • Mental retardation Sister    • Stroke Maternal Grandmother    • Stroke Paternal Grandmother    • Diabetes Paternal Grandmother    • Breast cancer Cousin 36     MATERNAL 1ST COUSIN    • Colon cancer Maternal Uncle      70S   • Colon cancer Maternal Uncle      70S   • Colon cancer Cousin      MATERNAL COUSIN   • Colon cancer Cousin      MATERNAL COUSIN   • Colon cancer Cousin      MATERNAL COUSIN       Vital Signs:  Visit Vitals   • /65 (BP Location: Left arm, Patient Position: Sitting, Cuff Size: Adult)   • Pulse 89   • Temp 98.5 °F (36.9 °C) (Temporal Artery )   • Ht 66\" (167.6 cm)   • Wt 182 lb 12.8 oz (82.9 kg)   • SpO2 94%   • BMI 29.5 kg/m2        Medications:    Current Outpatient Prescriptions:   •  ALPRAZolam (XANAX) 0.5 MG tablet, take 1 tablet by mouth at bedtime, Disp: 30 tablet, Rfl: 0  •  aspirin 81 MG tablet, Take by mouth daily., Disp: , Rfl:   •  escitalopram (LEXAPRO) 20 MG tablet, take 1 tablet by mouth once daily, Disp: 30 tablet, Rfl: 5  •  Estradiol (ESTROGEL TD), Place  on the skin., Disp: , Rfl:   •  FLONASE ALLERGY RELIEF 50 MCG/ACT nasal spray, instill 2 sprays into each nostril once daily, Disp: , Rfl: 0  •  gabapentin (NEURONTIN) 100 MG capsule, Take 2 capsules by mouth every night., Disp: 60 capsule, Rfl: 1  •  ibuprofen (ADVIL,MOTRIN) 400 MG tablet, , Disp: , Rfl: 0  •  lisinopril-hydrochlorothiazide (PRINZIDE,ZESTORETIC) " 10-12.5 MG per tablet, take 1 tablet by mouth once daily, Disp: 30 tablet, Rfl: 5  •  simvastatin (ZOCOR) 40 MG tablet, take 1 tablet by mouth at bedtime, Disp: 30 tablet, Rfl: 5  •  traZODone (DESYREL) 100 MG tablet, Take 1 tablet by mouth Every Night., Disp: 90 tablet, Rfl: 1  •  zolpidem (AMBIEN) 5 MG tablet, take 1 tablet by mouth at bedtime if needed for sleep, Disp: 30 tablet, Rfl: 3  •  azithromycin (ZITHROMAX Z-ADELINE) 250 MG tablet, Take 2 tablets the first day, then 1 tablet daily for 4 days., Disp: 6 tablet, Rfl: 0  •  oxyCODONE-acetaminophen (PERCOCET) 5-325 MG per tablet, take 1 to 2 tablets by mouth every 4 hours if needed for pain, Disp: , Rfl: 0  •  promethazine-dextromethorphan (PROMETHAZINE-DM) 6.25-15 MG/5ML syrup, Take 5 mL by mouth 4 (four) times a day as needed for cough., Disp: 180 mL, Rfl: 0  •  traMADol (ULTRAM) 50 MG tablet, take 1 to 2 tablets by mouth every 6 hours if needed for pain, Disp: , Rfl: 0     Allergies:  Allergies   Allergen Reactions   • Other        Physical Examination:  General Appearance:  Patient is in no distress.  She is well kept and has an  overweight  build.   Psychiatric:  Patient with appropriate mood and affect. Alert and oriented to self, time, and place.    Breast, RIGHT:  medium  sized, symmetric with the contralateral side.  Breast skin is without erythema, edema, rashes bilaterally.  There are no visible abnormalities upon inspection during the arm-raising maneuver or with hands on hips in the sitting position. There is no nipple retraction or nipple/areolar skin changes. There is a well healed periareolar incision 12:00 and UIQ from 5-2012 excision of papilloma on core. There are no masses palpable in the sitting or supine positions.      Breast, LEFT:   medium  sized, symmetric with the contralateral side.  Breast skin is without erythema, edema, rashes bilaterally.  She has a well healed curvilinear incision at the upper inner periareolar location 9-12:00.   There is no nipple retraction, no discharge or nipple/areolar skin changes.  There are no masses palpable in the sitting or supine positions.     Lymphatic:  There is no axillary, cervical, infraclavicular, or supraclavicular adenopathy bilaterally.  Eyes:  Pupils are round and reactive to light.  Cardiovascular:  Heart rate and rhythm are regular.  Respiratory:  Lungs are clear bilaterally with no crackles or wheezes in any lung field.  Gastrointestinal:  Abdomen is soft, nondistended, and nontender. There are no scars from previous surgery.   Musculoskeletal:  Good strength in all 4 extremities.  There is good range of motion in both shoulders.   Skin:  No new skin lesions or rashes on the skin excluding the breast (see breast exam above).          Imaging:   3-15-11[facility] mammogram bilateral- showed interval development of calcifications 6:00 RIGHT middle third slightly retroareolar location, rec stereo. LEFT breast no abnormal findings.     [id number] Right Digital Diagnostic Mammography [facility]  Impression: Benign postbiopsy findings of the right breast.  BIRADS CATEGORY 2: Benign    03-05-12 Screening mammogram bilateral [facility] Katheryn Andersen  Impression: Stable benign mammogram.   BIRADS Category 1    03-13-12 Right breast sonography [facility] Katheryn Andersen  Impression: 0.7 cm retroareolar right breast mass at the 12 o'clock position that has features suggestive of an intraductal location. This may represent a papilloma.   BIRADS Category 4    09-25-12         Right Breast Sonography          [facility]         Katheryn Andersen  Findings:  Sonographic evaluation of area of surgical excision of the papilla was performed.  corresponds  to the 12:00 o'clock  position  retroareolar.  At this location there is a 1. 5 x 1. 4 x 0. 9 cm  postoperative fluid collection.  BIRADS  Category  2:   Benign    03/06/13      Digital Screening Mammogram     [facility]       Katheryn Andersen   Stable fibroglandular pattern.    Scattered fibroglandular densities.   CONCLUSION: BIRADS CATEGORY 2: Benign     04/07/14      [facility]       Bilateral Mammography         Katheryn [facility]   There are scattered fibroglandular densities.   Finding 1: Stable punctate calcifications both breasts.   Finding 2: Isodense focal asymmetry left breast at 9 o'clock 7 centimeters from the nipple.   IMPRESSION:  Finding 1: Benign Negative   Finding 2:   BIRADS Category 0    04/21/14       [facility]       Left Diag Mammogram with Tomosynthesis        Katheryn Andersen   There are scatted fibroglandular densities.   Finding 1: Additional evaluation was performed for the focal asymmetry. Isodense focal asymmetry in the left breast at 10 o'clock located 7 centimeters from the nipple.   LEFT BREAST ULTRASOUND:  Finding 1: Oval elongated complex cyst 13 millimeters.   Finding 2: Oval elongated complicated cyst versus solid mass with circumscribed margins measuring 6 millimeters seen in the left breast at 9 o'clock.   IMPRESSION:   Finding 1: Complex cyst in the left breast is suspicious.   Finding 2: Complicated cyst versus solid mass in the left breast at 9 o'clock is suspicious.   BIRADS category 4A: Suspicious Abnormality    12/18/14          [facility]          LEFT BREAST ULTRASOUND           [facility]  Finding 1: follow up focal asymmetry in the left breast, 10 o'clock seen is no evidence of any solid mass or abnormal cystic elements.   Finding 2: Followup complicated cyst versus solid mass left breast, 9 o'clock. 6 millimeters in the left breast at 9 o'clock. Mass is vascular. Does not persist.   Finding 3: There are two simple cysts seen in the left breast.   IMPRESSION:  Finding 1: Benign Negative  Finding 2: Benign Negative   Finding 3: Benign Negative   BIRADS Category 2: Benign     04/09/15         [facility]         Bilateral Screening Mammogram with Tomosynthesis          Katheryn Andersen  Yearly screening mammogram.  The breasts are heterogeneously dense.  There are new  punctate calcifications with grouped distribution seen in the right breast at 5 o'clock.  IMPRESSION:  Birads Category 0    04/14/15          [facility]            RIGHT DIAGNOSTIC MAMMOGRAPHY         Ebdavidpauladonny Katheryn  The breast is heterogeneously dense.   Finding 1: Additional evaluation calcifications in the right breast, 5 o'clock 9 millimeters with clustered distribution in the anterior one-third region of the right breast at 5 o'clock. Increased number of calcifications.   Finding 2: Multiple punctate and fine granular calcifications measuring 9 millimeters clustered middle one-third lower inner region of the right breast.   Finding 3: Multiple fine granular calcifications 9 millimeters clustered anterior one-third upper outer region of the right breast.   IMPRESSION;   Finding 1: Calcifications anterior one-third region right breast at 5 o'clock are suspicious.   Finding 2: Calcification middle one-third lower inner region right breast are suspicious.   Finding 3: Anterior one-third upper outer region of the right breast are probably benign. Follow up mammography in 6 months.   BIRADS category 4A    10/14/15        [facility]     Right Breast Diagnostic Mammogram with Tomosynthesis                              Katheryn Ebmanaz  The breast is heterogeneously dense.  Finding 1:  Follow-up calcifications in the right breast,5 o'clock.   in an area of prior needle biopsy. a decreased number of calcifications.  Finding 2:  Follow-up calcifications in the right breast,lower inner   few stable punctate and very faint calcifications in the middle one-third lower inner region of the right breast..  in an area of prior needle biopsy. decreased number of calcifications.  Finding 3:  Follow-up  calcifications in the right breast, upper outer. grouped distribution in the anterior one-third upper outer region of the right breast.  These demonstrate morphology similar to the groups which were biopsied.  There has been  no significant change.  IMPRESSION:  Finding 1:  Benign-negative.  Finding 2:  Benign-negative  Finding 3:  Stable punctate calcifications in the anterior one-third upper outer region of the right breast are probably benign.  A 6 month follow-up diagnostic mammogram is recommended.  BI-RADS Category 3- Probably benign    10/14/15        [facility]     Right Breast Diagnostic Mammogram with Tomosynthesis                              [facility]  The breast is heterogeneously dense.  Finding 1:  Follow-up calcifications in the right breast,5 o'clock.   in an area of prior needle biopsy. a decreased number of calcifications.  Finding 2:  Follow-up calcifications in the right breast,lower inner   few stable punctate and very faint calcifications in the middle one-third lower inner region of the right breast..  in an area of prior needle biopsy. decreased number of calcifications.  Finding 3:  Follow-up  calcifications in the right breast, upper outer. grouped distribution in the anterior one-third upper outer region of the right breast.  These demonstrate morphology similar to the groups which were biopsied.  There has been no significant change.  IMPRESSION:  Finding 1:  Benign-negative.  Finding 2:  Benign-negative  Finding 3:  Stable punctate calcifications in the anterior one-third upper outer region of the right breast are probably benign.  A 6 month follow-up diagnostic mammogram is recommended.  BI-RADS Category 3- Probably benign    04-15-16                    [facility]                 BILATERIAL DIAGNOSTIC MAMMOGRAM            DEBBY, KATHERYN  BILATERAL DIAGNOSTIC MAMMOGRAM WITH TOMOSYNTHESIS  The breasts are heterogeneously dense.  Finding 1: Calcifications in the right breast upper anterior thirds of the breast. No suspicious forms are seen no significant change.  Finding 2. Stable calcifications seen region of the right breast.  IMPRESSIONS:  Finding 1. Stable probably benign. Follow-up  mammography in 6 months is recommended. Follow-up ultrasound of the right breast in 6 months is recommended.  Finding 2. Benign- negative  BI-RADS Category 3: Probably Benign    10-25-16                          [facility] RIGHT DIAG MAMMO WITH TOMOSYNTHESIS              JOSE A BORREGO  The breast is heterogeneously dense. Follow-up calcifications right breast upper outer stable no significant change.  RIGHT BREAST ULTRASOUND  Follow-up calcifications in the right breast upper outer. There is no evidence of any solid mass or abnormal cystic elements.  IMPRESSION:  Benign-Negative  BI-RADS Category 2: Benign    Procedures:      Assessment:   Diagnosis Plan   1. Breast calcifications on mammogram  Mammo Screening Digital Tomosynthesis Bilateral With CAD   2. Diffuse cystic mastopathy, unspecified laterality  Mammo Screening Digital Tomosynthesis Bilateral With CAD   3. Papilloma of breast, unspecified laterality  Mammo Screening Digital Tomosynthesis Bilateral With CAD   4. FH: breast cancer  Mammo Screening Digital Tomosynthesis Bilateral With CAD     1-  RIGHT ANTERIOR TO POSTERIOR THIRD UOQ 9mm, right central - BR3 in 4-2015,  , and April 2016- BR2 in     2-  -RIGHT breast 5:00 calcifications- anterior 1/3 9mm- stereo 4-2015- usual hyperplasia, CCC, apocrine metasplasia and dilated ducts- hourglass marker  RIGHT breast LIQ, CCC, usual hyperplasia, sclerosing adenosis, and apocrine metasplasia- tophat marker    -RIGHT breast, central breast, 6:00, BR4 on 3-15-11mammogram; post stereo 4-4-11 benign proliferative and FCC with calcifications. 6 mo FU imaging benign 9-2011    3-  -LEFT breast papilloma 9:00- core at [facility]  4-2014 with complete removal,  Noexcision. LEFT US benign .    -  LEFT breast papilloma, focal radial scar.  Workup triggered by LEFT bloody nipple discharge, which prompted an MRI - this demonstrated a lesion which was biopsied and lead to an excisional biopsy. Exicisonal biopsy showed  papilloma, columnar change and radial scar, no atypia. Followup imaging benign and nipple discharge resolved.      4-  Maternal cousin age 36- this cousin's mother (not of blood relation to Katheryn- had breast cancer and had BRCA testing that showed indeterminate variant per Katheryn's report; this cousin's father is Katheryn's blood uncle)      Plan:  Mrs. Aguilar is doing well today at her interval follow-up visit for the microcalcifications in the right breast.  We reviewed her interval history and imaging with reports.  Her examination is in good order.    [facility]  We will arrange that today and see her back after.    I asked her to continue her self breast exam and to call us in the interim with any concerns or changes.      Elba Urban MD              Next Appointment:  Return for Next scheduled follow up, after imaging.

## 2017-01-16 ENCOUNTER — TELEPHONE (OUTPATIENT)
Dept: SURGERY | Facility: CLINIC | Age: 62
End: 2017-01-16

## 2017-01-16 RX ORDER — ESCITALOPRAM OXALATE 20 MG/1
TABLET ORAL
Qty: 30 TABLET | Refills: 5 | Status: SHIPPED | OUTPATIENT
Start: 2017-01-16 | End: 2017-07-11 | Stop reason: SDUPTHER

## 2017-01-16 NOTE — TELEPHONE ENCOUNTER
Left msg for pt to return call to inform of appts: XIN 4-18-17 10:00 for Mammo/Dr. Urban:5-2-17 8:45 arrival    dmg  Letter sent to inform of appts

## 2017-01-24 RX ORDER — ALPRAZOLAM 0.5 MG/1
TABLET ORAL
Qty: 30 TABLET | Refills: 0 | Status: SHIPPED | OUTPATIENT
Start: 2017-01-24 | End: 2017-08-17 | Stop reason: SDUPTHER

## 2017-02-03 RX ORDER — LISINOPRIL AND HYDROCHLOROTHIAZIDE 12.5; 1 MG/1; MG/1
TABLET ORAL
Qty: 30 TABLET | Refills: 5 | Status: SHIPPED | OUTPATIENT
Start: 2017-02-03 | End: 2017-08-08 | Stop reason: SDUPTHER

## 2017-02-21 RX ORDER — ZOLPIDEM TARTRATE 5 MG/1
TABLET ORAL
Qty: 30 TABLET | Refills: 3 | Status: SHIPPED | OUTPATIENT
Start: 2017-02-21 | End: 2017-06-18 | Stop reason: SDUPTHER

## 2017-04-17 ENCOUNTER — APPOINTMENT (OUTPATIENT)
Dept: GENERAL RADIOLOGY | Facility: HOSPITAL | Age: 62
End: 2017-04-17

## 2017-04-17 ENCOUNTER — OFFICE VISIT (OUTPATIENT)
Dept: FAMILY MEDICINE CLINIC | Facility: CLINIC | Age: 62
End: 2017-04-17

## 2017-04-17 VITALS
HEIGHT: 66 IN | RESPIRATION RATE: 16 BRPM | BODY MASS INDEX: 28.87 KG/M2 | HEART RATE: 68 BPM | SYSTOLIC BLOOD PRESSURE: 124 MMHG | WEIGHT: 179.6 LBS | OXYGEN SATURATION: 95 % | TEMPERATURE: 97.9 F | DIASTOLIC BLOOD PRESSURE: 74 MMHG

## 2017-04-17 DIAGNOSIS — M54.50 CHRONIC LOW BACK PAIN WITHOUT SCIATICA, UNSPECIFIED BACK PAIN LATERALITY: Primary | ICD-10-CM

## 2017-04-17 DIAGNOSIS — G47.00 INSOMNIA, UNSPECIFIED TYPE: ICD-10-CM

## 2017-04-17 DIAGNOSIS — E78.00 PURE HYPERCHOLESTEROLEMIA: ICD-10-CM

## 2017-04-17 DIAGNOSIS — I10 ESSENTIAL HYPERTENSION: ICD-10-CM

## 2017-04-17 DIAGNOSIS — Z12.11 COLON CANCER SCREENING: ICD-10-CM

## 2017-04-17 DIAGNOSIS — G89.29 CHRONIC LOW BACK PAIN WITHOUT SCIATICA, UNSPECIFIED BACK PAIN LATERALITY: Primary | ICD-10-CM

## 2017-04-17 DIAGNOSIS — R73.9 HYPERGLYCEMIA: ICD-10-CM

## 2017-04-17 PROCEDURE — 99214 OFFICE O/P EST MOD 30 MIN: CPT | Performed by: INTERNAL MEDICINE

## 2017-04-17 NOTE — PROGRESS NOTES
"Subjective   Patient ID: Katheryn Andersen is a 61 y.o. female presents with   Chief Complaint   Patient presents with   • Hyperlipidemia     f/u   • Hypertension     pt is fasting for labs       HPI - this patient presents today to follow-up for hypercholesterolemia, hypertension, hyperglycemia, insomnia and it's time for another colonoscopy.  She does complain of some chronic low back pain that's intermittent it's mild in intensity it does not radiate.  She was hoping to get an x-ray    Assessment plan    Hypertension - controlled with lisinopril hydrochlorothiazide    Hyperlipidemia-controlled with simvastatin 40, fasting lipid profile    Hyperglycemia-we'll check an A1c CMP    Low back pain-we'll get an x-ray she may take naproxen daily when necessary    Back pain we'll get an x-ray of the lumbar spine        Allergies   Allergen Reactions   • Other        The following portions of the patient's history were reviewed and updated as appropriate: allergies, current medications, past family history, past medical history, past social history, past surgical history and problem list.      Review of Systems   Constitutional: Negative.    HENT: Negative.    Eyes: Negative.    Respiratory: Negative.    Cardiovascular: Negative.    Gastrointestinal: Negative.    Endocrine: Negative.    Genitourinary: Negative.    Musculoskeletal: Positive for back pain.   Skin: Negative.    Allergic/Immunologic: Negative.    Neurological: Negative.    Hematological: Negative.    Psychiatric/Behavioral: Negative.        Objective     Vitals:    04/17/17 1037   BP: 124/74   Pulse: 68   Resp: 16   Temp: 97.9 °F (36.6 °C)   TempSrc: Oral   SpO2: 95%   Weight: 179 lb 9.6 oz (81.5 kg)   Height: 66\" (167.6 cm)         Physical Exam   Constitutional: She is oriented to person, place, and time. She appears well-developed and well-nourished. No distress.   HENT:   Head: Normocephalic and atraumatic.   Eyes: Conjunctivae and EOM are normal. Pupils are " equal, round, and reactive to light. Right eye exhibits no discharge. Left eye exhibits no discharge. No scleral icterus.   Neck: Normal range of motion. Neck supple. No tracheal deviation present. No thyromegaly present.   Cardiovascular: Normal rate, regular rhythm, normal heart sounds and normal pulses.  Exam reveals no gallop.    No murmur heard.  Pulmonary/Chest: Effort normal and breath sounds normal. No respiratory distress. She has no wheezes. She has no rales.   Abdominal: Soft. Bowel sounds are normal. There is no tenderness.   Musculoskeletal: Normal range of motion.   Neurological: She is alert and oriented to person, place, and time. She exhibits normal muscle tone. Coordination normal.   Skin: Skin is warm and dry. No rash noted. No erythema. No pallor.   Psychiatric: She has a normal mood and affect. Her behavior is normal. Judgment and thought content normal.   Nursing note and vitals reviewed.        Katheryn was seen today for hyperlipidemia and hypertension.    Diagnoses and all orders for this visit:    Chronic low back pain without sciatica, unspecified back pain laterality  -     XR Spine Lumbar 2 or 3 View  -     Lipid Panel  -     Hemoglobin A1c  -     CBC & Differential  -     Comprehensive Metabolic Panel    Pure hypercholesterolemia  -     XR Spine Lumbar 2 or 3 View  -     Lipid Panel  -     Hemoglobin A1c  -     CBC & Differential  -     Comprehensive Metabolic Panel    Essential hypertension  -     XR Spine Lumbar 2 or 3 View  -     Lipid Panel  -     Hemoglobin A1c  -     CBC & Differential  -     Comprehensive Metabolic Panel    Hyperglycemia  -     XR Spine Lumbar 2 or 3 View  -     Lipid Panel  -     Hemoglobin A1c  -     CBC & Differential  -     Comprehensive Metabolic Panel    Insomnia, unspecified type  -     XR Spine Lumbar 2 or 3 View  -     Lipid Panel  -     Hemoglobin A1c  -     CBC & Differential  -     Comprehensive Metabolic Panel    Colon cancer screening  -     Ambulatory  Referral to Gastroenterology        Call or return to clinic prn if these symptoms worsen or fail to improve as anticipated.

## 2017-04-18 ENCOUNTER — APPOINTMENT (OUTPATIENT)
Dept: WOMENS IMAGING | Facility: HOSPITAL | Age: 62
End: 2017-04-18

## 2017-04-18 LAB
ALBUMIN SERPL-MCNC: 4.5 G/DL (ref 3.5–5.2)
ALBUMIN/GLOB SERPL: 1.9 G/DL
ALP SERPL-CCNC: 44 U/L (ref 39–117)
ALT SERPL-CCNC: 16 U/L (ref 1–33)
AST SERPL-CCNC: 21 U/L (ref 1–32)
BASOPHILS # BLD AUTO: 0.04 10*3/MM3 (ref 0–0.2)
BASOPHILS NFR BLD AUTO: 0.8 % (ref 0–1.5)
BILIRUB SERPL-MCNC: 0.2 MG/DL (ref 0.1–1.2)
BUN SERPL-MCNC: 14 MG/DL (ref 8–23)
BUN/CREAT SERPL: 21.5 (ref 7–25)
CALCIUM SERPL-MCNC: 9.7 MG/DL (ref 8.6–10.5)
CHLORIDE SERPL-SCNC: 100 MMOL/L (ref 98–107)
CHOLEST SERPL-MCNC: 204 MG/DL (ref 0–200)
CO2 SERPL-SCNC: 24.4 MMOL/L (ref 22–29)
CREAT SERPL-MCNC: 0.65 MG/DL (ref 0.57–1)
EOSINOPHIL # BLD AUTO: 0.16 10*3/MM3 (ref 0–0.7)
EOSINOPHIL NFR BLD AUTO: 3.1 % (ref 0.3–6.2)
ERYTHROCYTE [DISTWIDTH] IN BLOOD BY AUTOMATED COUNT: 13.3 % (ref 11.7–13)
GLOBULIN SER CALC-MCNC: 2.4 GM/DL
GLUCOSE SERPL-MCNC: 102 MG/DL (ref 65–99)
HBA1C MFR BLD: 5.6 % (ref 4.8–5.6)
HCT VFR BLD AUTO: 42.2 % (ref 35.6–45.5)
HDLC SERPL-MCNC: 52 MG/DL (ref 40–60)
HGB BLD-MCNC: 14 G/DL (ref 11.9–15.5)
IMM GRANULOCYTES # BLD: 0 10*3/MM3 (ref 0–0.03)
IMM GRANULOCYTES NFR BLD: 0 % (ref 0–0.5)
LDLC SERPL CALC-MCNC: 107 MG/DL (ref 0–100)
LYMPHOCYTES # BLD AUTO: 1.52 10*3/MM3 (ref 0.9–4.8)
LYMPHOCYTES NFR BLD AUTO: 29.3 % (ref 19.6–45.3)
MCH RBC QN AUTO: 30.7 PG (ref 26.9–32)
MCHC RBC AUTO-ENTMCNC: 33.2 G/DL (ref 32.4–36.3)
MCV RBC AUTO: 92.5 FL (ref 80.5–98.2)
MONOCYTES # BLD AUTO: 0.4 10*3/MM3 (ref 0.2–1.2)
MONOCYTES NFR BLD AUTO: 7.7 % (ref 5–12)
NEUTROPHILS # BLD AUTO: 3.07 10*3/MM3 (ref 1.9–8.1)
NEUTROPHILS NFR BLD AUTO: 59.1 % (ref 42.7–76)
PLATELET # BLD AUTO: 278 10*3/MM3 (ref 140–500)
POTASSIUM SERPL-SCNC: 4.4 MMOL/L (ref 3.5–5.2)
PROT SERPL-MCNC: 6.9 G/DL (ref 6–8.5)
RBC # BLD AUTO: 4.56 10*6/MM3 (ref 3.9–5.2)
SODIUM SERPL-SCNC: 140 MMOL/L (ref 136–145)
TRIGL SERPL-MCNC: 226 MG/DL (ref 0–150)
VLDLC SERPL CALC-MCNC: 45.2 MG/DL (ref 5–40)
WBC # BLD AUTO: 5.19 10*3/MM3 (ref 4.5–10.7)

## 2017-04-18 PROCEDURE — 77063 BREAST TOMOSYNTHESIS BI: CPT | Performed by: RADIOLOGY

## 2017-04-18 PROCEDURE — G0202 SCR MAMMO BI INCL CAD: HCPCS | Performed by: RADIOLOGY

## 2017-04-18 PROCEDURE — 77067 SCR MAMMO BI INCL CAD: CPT | Performed by: RADIOLOGY

## 2017-04-20 ENCOUNTER — TELEPHONE (OUTPATIENT)
Dept: SURGERY | Facility: CLINIC | Age: 62
End: 2017-04-20

## 2017-04-20 NOTE — TELEPHONE ENCOUNTER
[facility]yn [facility] 18th 2017 bilateral screening mammogram with 3-D.  Stable areas of asymmetric breast tissue.  BI-RADS 2.

## 2017-04-25 RX ORDER — TRAZODONE HYDROCHLORIDE 100 MG/1
TABLET ORAL
Qty: 90 TABLET | Refills: 1 | Status: SHIPPED | OUTPATIENT
Start: 2017-04-25 | End: 2017-08-17 | Stop reason: SDUPTHER

## 2017-05-02 ENCOUNTER — OFFICE VISIT (OUTPATIENT)
Dept: SURGERY | Facility: CLINIC | Age: 62
End: 2017-05-02

## 2017-05-02 VITALS
BODY MASS INDEX: 29.02 KG/M2 | HEART RATE: 90 BPM | WEIGHT: 180.6 LBS | HEIGHT: 66 IN | DIASTOLIC BLOOD PRESSURE: 82 MMHG | OXYGEN SATURATION: 97 % | TEMPERATURE: 98.8 F | SYSTOLIC BLOOD PRESSURE: 115 MMHG

## 2017-05-02 DIAGNOSIS — R92.1 BREAST CALCIFICATIONS ON MAMMOGRAM: ICD-10-CM

## 2017-05-02 DIAGNOSIS — N60.11 DIFFUSE CYSTIC MASTOPATHY, RIGHT: ICD-10-CM

## 2017-05-02 DIAGNOSIS — Z80.3 FH: BREAST CANCER: ICD-10-CM

## 2017-05-02 DIAGNOSIS — D24.9 PAPILLOMA OF BREAST, UNSPECIFIED LATERALITY: ICD-10-CM

## 2017-05-02 DIAGNOSIS — N60.19 DIFFUSE CYSTIC MASTOPATHY, UNSPECIFIED LATERALITY: ICD-10-CM

## 2017-05-02 PROCEDURE — 99212 OFFICE O/P EST SF 10 MIN: CPT | Performed by: SURGERY

## 2017-05-23 ENCOUNTER — TELEPHONE (OUTPATIENT)
Dept: FAMILY MEDICINE CLINIC | Facility: CLINIC | Age: 62
End: 2017-05-23

## 2017-06-19 RX ORDER — ZOLPIDEM TARTRATE 5 MG/1
TABLET ORAL
Qty: 30 TABLET | Refills: 3 | Status: SHIPPED | OUTPATIENT
Start: 2017-06-19 | End: 2017-08-17 | Stop reason: SDUPTHER

## 2017-07-03 RX ORDER — SIMVASTATIN 40 MG
TABLET ORAL
Qty: 30 TABLET | Refills: 5 | Status: SHIPPED | OUTPATIENT
Start: 2017-07-03 | End: 2017-08-17 | Stop reason: SDUPTHER

## 2017-07-11 RX ORDER — ESCITALOPRAM OXALATE 20 MG/1
TABLET ORAL
Qty: 30 TABLET | Refills: 5 | Status: SHIPPED | OUTPATIENT
Start: 2017-07-11 | End: 2017-08-17 | Stop reason: SDUPTHER

## 2017-08-08 RX ORDER — LISINOPRIL AND HYDROCHLOROTHIAZIDE 12.5; 1 MG/1; MG/1
TABLET ORAL
Qty: 30 TABLET | Refills: 5 | Status: SHIPPED | OUTPATIENT
Start: 2017-08-08 | End: 2017-08-17 | Stop reason: SDUPTHER

## 2017-08-14 ENCOUNTER — TELEPHONE (OUTPATIENT)
Dept: FAMILY MEDICINE CLINIC | Facility: CLINIC | Age: 62
End: 2017-08-14

## 2017-08-14 NOTE — TELEPHONE ENCOUNTER
Katheryn wants to know if you will send in Claritin D because she has met her max amount this year

## 2017-08-17 ENCOUNTER — TELEPHONE (OUTPATIENT)
Dept: FAMILY MEDICINE CLINIC | Facility: CLINIC | Age: 62
End: 2017-08-17

## 2017-08-17 ENCOUNTER — OFFICE VISIT (OUTPATIENT)
Dept: FAMILY MEDICINE CLINIC | Facility: CLINIC | Age: 62
End: 2017-08-17

## 2017-08-17 VITALS
DIASTOLIC BLOOD PRESSURE: 82 MMHG | SYSTOLIC BLOOD PRESSURE: 118 MMHG | TEMPERATURE: 98.1 F | RESPIRATION RATE: 16 BRPM | WEIGHT: 183.6 LBS | BODY MASS INDEX: 29.51 KG/M2 | OXYGEN SATURATION: 95 % | HEIGHT: 66 IN | HEART RATE: 75 BPM

## 2017-08-17 DIAGNOSIS — I10 ESSENTIAL HYPERTENSION: ICD-10-CM

## 2017-08-17 DIAGNOSIS — E78.00 PURE HYPERCHOLESTEROLEMIA: ICD-10-CM

## 2017-08-17 DIAGNOSIS — F41.9 ANXIETY: ICD-10-CM

## 2017-08-17 DIAGNOSIS — G25.0 ESSENTIAL TREMOR: Primary | ICD-10-CM

## 2017-08-17 PROCEDURE — 99214 OFFICE O/P EST MOD 30 MIN: CPT | Performed by: INTERNAL MEDICINE

## 2017-08-17 RX ORDER — PRIMIDONE 50 MG/1
50 TABLET ORAL NIGHTLY
Qty: 30 TABLET | Refills: 5 | Status: SHIPPED | OUTPATIENT
Start: 2017-08-17 | End: 2018-04-16

## 2017-08-17 RX ORDER — ALPRAZOLAM 0.5 MG/1
0.5 TABLET ORAL NIGHTLY PRN
Qty: 30 TABLET | Refills: 1 | Status: SHIPPED | OUTPATIENT
Start: 2017-08-17 | End: 2018-04-09 | Stop reason: SDUPTHER

## 2017-08-17 RX ORDER — TRAZODONE HYDROCHLORIDE 100 MG/1
100 TABLET ORAL NIGHTLY
Qty: 90 TABLET | Refills: 1 | Status: SHIPPED | OUTPATIENT
Start: 2017-08-17 | End: 2018-04-17 | Stop reason: SDUPTHER

## 2017-08-17 RX ORDER — LISINOPRIL AND HYDROCHLOROTHIAZIDE 12.5; 1 MG/1; MG/1
1 TABLET ORAL DAILY
Qty: 90 TABLET | Refills: 1 | Status: SHIPPED | OUTPATIENT
Start: 2017-08-17 | End: 2018-03-06 | Stop reason: SDUPTHER

## 2017-08-17 RX ORDER — ESCITALOPRAM OXALATE 20 MG/1
20 TABLET ORAL DAILY
Qty: 90 TABLET | Refills: 1 | Status: SHIPPED | OUTPATIENT
Start: 2017-08-17 | End: 2018-04-16 | Stop reason: ALTCHOICE

## 2017-08-17 RX ORDER — ZOLPIDEM TARTRATE 5 MG/1
5 TABLET ORAL NIGHTLY PRN
Qty: 90 TABLET | Refills: 0 | Status: SHIPPED | OUTPATIENT
Start: 2017-08-17 | End: 2017-11-14 | Stop reason: SDUPTHER

## 2017-08-17 RX ORDER — SIMVASTATIN 40 MG
40 TABLET ORAL NIGHTLY
Qty: 90 TABLET | Refills: 1 | Status: SHIPPED | OUTPATIENT
Start: 2017-08-17 | End: 2021-11-02 | Stop reason: HOSPADM

## 2017-08-17 NOTE — PROGRESS NOTES
"Subjective   Patient ID: Katheryn Andersen is a 62 y.o. female presents with   Chief Complaint   Patient presents with   • Head Shaking     tremor       HPI - This is a patient with a past history of hypertension, insomnia, anxiety presenting with a tremor of the head that's been going on for the past for 5 years.  She's been evaluated for this before by her primary care physician.  She was told that this was essential tremor.  Overall she doesn't see that it's gotten much worse may be a bit.  She has no other neurologic symptom.  She would like to try something for this and if that doesn't work she like to go see a neurologist    Assessment plan    Likely essential tremor Mysoline 50 mg by mouth daily at bedtime    Hypertension controlled with lisinopril hydrochlorothiazide    Anxiety refill Xanax this medicine helps with her quality of life she has no adverse effects she is adherent to regimen.  Seth report was obtained narcotic agreement was signed.    Allergies   Allergen Reactions   • Other        The following portions of the patient's history were reviewed and updated as appropriate: allergies, current medications, past family history, past medical history, past social history, past surgical history and problem list.      Review of Systems   Constitutional: Negative.    HENT: Negative.    Eyes: Negative.    Respiratory: Negative.    Cardiovascular: Negative.    Gastrointestinal: Negative.    Endocrine: Negative.    Genitourinary: Negative.    Musculoskeletal: Negative.    Skin: Negative.    Allergic/Immunologic: Negative.    Neurological: Positive for tremors.   Hematological: Negative.    Psychiatric/Behavioral: Positive for sleep disturbance. The patient is nervous/anxious.        Objective     Vitals:    08/17/17 1035   BP: 118/82   Pulse: 75   Resp: 16   Temp: 98.1 °F (36.7 °C)   TempSrc: Oral   SpO2: 95%   Weight: 183 lb 9.6 oz (83.3 kg)   Height: 66\" (167.6 cm)         Physical Exam   Constitutional: She " is oriented to person, place, and time. She appears well-developed and well-nourished.   HENT:   Head: Normocephalic and atraumatic.   Neurological: She is alert and oriented to person, place, and time. She displays normal reflexes. She exhibits normal muscle tone.   Slight low frequency tremor of the head, negative for cogwheeling   Psychiatric: She has a normal mood and affect. Her behavior is normal.   Nursing note and vitals reviewed.        Katheryn was seen today for head shaking.    Diagnoses and all orders for this visit:    Essential tremor    Pure hypercholesterolemia    Essential hypertension    Anxiety    Other orders  -     primidone (MYSOLINE) 50 MG tablet; Take 1 tablet by mouth Every Night.  -     ALPRAZolam (XANAX) 0.5 MG tablet; Take 1 tablet by mouth At Night As Needed for Anxiety.  -     loratadine-pseudoephedrine (CLARITIN-D 24 HOUR)  MG per 24 hr tablet; Take 1 tablet by mouth Daily. Use generic if available  -     zolpidem (AMBIEN) 5 MG tablet; Take 1 tablet by mouth At Night As Needed for Sleep.  -     traZODone (DESYREL) 100 MG tablet; Take 1 tablet by mouth Every Night.  -     simvastatin (ZOCOR) 40 MG tablet; Take 1 tablet by mouth Every Night.  -     lisinopril-hydrochlorothiazide (PRINZIDE,ZESTORETIC) 10-12.5 MG per tablet; Take 1 tablet by mouth Daily.  -     escitalopram (LEXAPRO) 20 MG tablet; Take 1 tablet by mouth Daily.        Call or return to clinic prn if these symptoms worsen or fail to improve as anticipated.

## 2017-08-17 NOTE — TELEPHONE ENCOUNTER
[facility] [phone]: Theodore Holbrook, or Agusto Holbrook She has decided that She would rather you refer her to those neurologist rather than do the medication.

## 2017-10-16 ENCOUNTER — OFFICE VISIT (OUTPATIENT)
Dept: FAMILY MEDICINE CLINIC | Facility: CLINIC | Age: 62
End: 2017-10-16

## 2017-10-16 VITALS
DIASTOLIC BLOOD PRESSURE: 84 MMHG | TEMPERATURE: 97.9 F | SYSTOLIC BLOOD PRESSURE: 120 MMHG | WEIGHT: 183 LBS | BODY MASS INDEX: 29.41 KG/M2 | OXYGEN SATURATION: 94 % | HEIGHT: 66 IN | HEART RATE: 82 BPM | RESPIRATION RATE: 16 BRPM

## 2017-10-16 DIAGNOSIS — N95.2 ATROPHIC VAGINITIS: ICD-10-CM

## 2017-10-16 DIAGNOSIS — I10 ESSENTIAL HYPERTENSION: ICD-10-CM

## 2017-10-16 DIAGNOSIS — G47.00 INSOMNIA, UNSPECIFIED TYPE: ICD-10-CM

## 2017-10-16 DIAGNOSIS — Z00.00 HEALTH CARE MAINTENANCE: ICD-10-CM

## 2017-10-16 DIAGNOSIS — K59.00 CONSTIPATION, UNSPECIFIED CONSTIPATION TYPE: ICD-10-CM

## 2017-10-16 DIAGNOSIS — E78.00 PURE HYPERCHOLESTEROLEMIA: Primary | ICD-10-CM

## 2017-10-16 DIAGNOSIS — R73.9 HYPERGLYCEMIA: ICD-10-CM

## 2017-10-16 DIAGNOSIS — Z23 NEED FOR VACCINATION: Primary | ICD-10-CM

## 2017-10-16 PROCEDURE — 90471 IMMUNIZATION ADMIN: CPT | Performed by: INTERNAL MEDICINE

## 2017-10-16 PROCEDURE — 99214 OFFICE O/P EST MOD 30 MIN: CPT | Performed by: INTERNAL MEDICINE

## 2017-10-16 PROCEDURE — 90686 IIV4 VACC NO PRSV 0.5 ML IM: CPT | Performed by: INTERNAL MEDICINE

## 2017-10-16 NOTE — PROGRESS NOTES
"Subjective   Patient ID: Katheryn Andersen is a 62 y.o. female presents with   Chief Complaint   Patient presents with   • Hyperlipidemia     f/u, pt is fasting for labs       HPI - This patient presents today for hypercholesterolemia, hypertension, hyperglycemia insomnia, constipation.  She is leaning on getting a colonoscopy very soon.  She needs labs drawn today.  Overall she feels pretty well.  She also needs a prescription of vaginal estrogen.  She is caught up on her mammograms.  She's been on this medicine in the past and needs a refill.    Assessment plan    Hypercholesterolemia-continue simvastatin will get a fasting lipid profile CMP    Essential hypertension controlled with lisinopril hydrochlorothiazide    Insomnia she takes Ambien when necessary this medicine helps with her quality of life she has no adverse effects she is adherent to regimen    Atrophic vaginitis topical vaginal estrogen.  Patient was advised to stay caught up on mammograms.  She says she will.    Hyperglycemia we'll get a A1c.    Allergies   Allergen Reactions   • Other        The following portions of the patient's history were reviewed and updated as appropriate: allergies, current medications, past family history, past medical history, past social history, past surgical history and problem list.      Review of Systems   Constitutional: Negative.    HENT: Negative.    Eyes: Negative.    Respiratory: Negative.    Cardiovascular: Negative.    Gastrointestinal: Negative.    Genitourinary: Negative.    Musculoskeletal: Negative.    Skin: Negative.    Neurological: Negative.    Hematological: Negative.    Psychiatric/Behavioral: Negative.        Objective     Vitals:    10/16/17 1048   BP: 120/84   Pulse: 82   Resp: 16   Temp: 97.9 °F (36.6 °C)   TempSrc: Oral   SpO2: 94%   Weight: 183 lb (83 kg)   Height: 66\" (167.6 cm)         Physical Exam   Constitutional: She is oriented to person, place, and time. She appears well-developed and " well-nourished. No distress.   HENT:   Head: Normocephalic and atraumatic.   Eyes: Conjunctivae and EOM are normal. Pupils are equal, round, and reactive to light. Right eye exhibits no discharge. Left eye exhibits no discharge. No scleral icterus.   Neck: Normal range of motion. Neck supple. No tracheal deviation present. No thyromegaly present.   Cardiovascular: Normal rate, regular rhythm, normal heart sounds and normal pulses.  Exam reveals no gallop.    No murmur heard.  Pulmonary/Chest: Effort normal and breath sounds normal. No respiratory distress. She has no wheezes. She has no rales.   Musculoskeletal: Normal range of motion.   Neurological: She is alert and oriented to person, place, and time.   Skin: Skin is warm and dry. No rash noted. No erythema. No pallor.   Psychiatric: She has a normal mood and affect. Her behavior is normal. Judgment and thought content normal.   Nursing note and vitals reviewed.        Katheryn was seen today for hyperlipidemia.    Diagnoses and all orders for this visit:    Pure hypercholesterolemia  -     Lipid Panel  -     Hemoglobin A1c  -     Comprehensive Metabolic Panel  -     CBC & Differential  -     TSH+Free T4    Essential hypertension  -     Lipid Panel  -     Hemoglobin A1c  -     Comprehensive Metabolic Panel  -     CBC & Differential  -     TSH+Free T4    Health care maintenance  -     Lipid Panel  -     Hemoglobin A1c  -     Comprehensive Metabolic Panel  -     CBC & Differential  -     TSH+Free T4    Hyperglycemia  -     Lipid Panel  -     Hemoglobin A1c  -     Comprehensive Metabolic Panel  -     CBC & Differential  -     TSH+Free T4    Insomnia, unspecified type  -     Lipid Panel  -     Hemoglobin A1c  -     Comprehensive Metabolic Panel  -     CBC & Differential  -     TSH+Free T4    Constipation, unspecified constipation type    Atrophic vaginitis    Other orders  -     conjugated estrogens (PREMARIN) 0.625 MG/GM vaginal cream; Insert  into the vagina  Daily.        Call or return to clinic prn if these symptoms worsen or fail to improve as anticipated.

## 2017-10-17 LAB
ALBUMIN SERPL-MCNC: 5.1 G/DL (ref 3.5–5.2)
ALBUMIN/GLOB SERPL: 2.3 G/DL
ALP SERPL-CCNC: 43 U/L (ref 39–117)
ALT SERPL-CCNC: 15 U/L (ref 1–33)
AST SERPL-CCNC: 16 U/L (ref 1–32)
BASOPHILS # BLD AUTO: 0.05 10*3/MM3 (ref 0–0.2)
BASOPHILS NFR BLD AUTO: 0.9 % (ref 0–1.5)
BILIRUB SERPL-MCNC: 0.3 MG/DL (ref 0.1–1.2)
BUN SERPL-MCNC: 14 MG/DL (ref 8–23)
BUN/CREAT SERPL: 17.9 (ref 7–25)
CALCIUM SERPL-MCNC: 9.9 MG/DL (ref 8.6–10.5)
CHLORIDE SERPL-SCNC: 98 MMOL/L (ref 98–107)
CHOLEST SERPL-MCNC: 199 MG/DL (ref 0–200)
CO2 SERPL-SCNC: 28.2 MMOL/L (ref 22–29)
CREAT SERPL-MCNC: 0.78 MG/DL (ref 0.57–1)
EOSINOPHIL # BLD AUTO: 0.15 10*3/MM3 (ref 0–0.7)
EOSINOPHIL NFR BLD AUTO: 2.8 % (ref 0.3–6.2)
ERYTHROCYTE [DISTWIDTH] IN BLOOD BY AUTOMATED COUNT: 12.5 % (ref 11.7–13)
GLOBULIN SER CALC-MCNC: 2.2 GM/DL
GLUCOSE SERPL-MCNC: 109 MG/DL (ref 65–99)
HBA1C MFR BLD: 5.33 % (ref 4.8–5.6)
HCT VFR BLD AUTO: 42.2 % (ref 35.6–45.5)
HDLC SERPL-MCNC: 47 MG/DL (ref 40–60)
HGB BLD-MCNC: 14.3 G/DL (ref 11.9–15.5)
IMM GRANULOCYTES # BLD: 0 10*3/MM3 (ref 0–0.03)
IMM GRANULOCYTES NFR BLD: 0 % (ref 0–0.5)
LDLC SERPL CALC-MCNC: 106 MG/DL (ref 0–100)
LYMPHOCYTES # BLD AUTO: 1.73 10*3/MM3 (ref 0.9–4.8)
LYMPHOCYTES NFR BLD AUTO: 31.9 % (ref 19.6–45.3)
MCH RBC QN AUTO: 31.8 PG (ref 26.9–32)
MCHC RBC AUTO-ENTMCNC: 33.9 G/DL (ref 32.4–36.3)
MCV RBC AUTO: 93.8 FL (ref 80.5–98.2)
MONOCYTES # BLD AUTO: 0.37 10*3/MM3 (ref 0.2–1.2)
MONOCYTES NFR BLD AUTO: 6.8 % (ref 5–12)
NEUTROPHILS # BLD AUTO: 3.12 10*3/MM3 (ref 1.9–8.1)
NEUTROPHILS NFR BLD AUTO: 57.6 % (ref 42.7–76)
PLATELET # BLD AUTO: 318 10*3/MM3 (ref 140–500)
POTASSIUM SERPL-SCNC: 4.2 MMOL/L (ref 3.5–5.2)
PROT SERPL-MCNC: 7.3 G/DL (ref 6–8.5)
RBC # BLD AUTO: 4.5 10*6/MM3 (ref 3.9–5.2)
SODIUM SERPL-SCNC: 138 MMOL/L (ref 136–145)
T4 FREE SERPL-MCNC: 0.94 NG/DL (ref 0.93–1.7)
TRIGL SERPL-MCNC: 232 MG/DL (ref 0–150)
TSH SERPL DL<=0.005 MIU/L-ACNC: 1.31 MIU/ML (ref 0.27–4.2)
VLDLC SERPL CALC-MCNC: 46.4 MG/DL (ref 5–40)
WBC # BLD AUTO: 5.42 10*3/MM3 (ref 4.5–10.7)

## 2017-11-14 RX ORDER — ZOLPIDEM TARTRATE 5 MG/1
TABLET ORAL
Qty: 90 TABLET | Refills: 0 | Status: SHIPPED | OUTPATIENT
Start: 2017-11-14

## 2017-12-04 ENCOUNTER — AMBULATORY SURGICAL CENTER (OUTPATIENT)
Dept: URBAN - METROPOLITAN AREA SURGERY 17 | Facility: SURGERY | Age: 62
End: 2017-12-04
Payer: COMMERCIAL

## 2017-12-04 ENCOUNTER — OFFICE (OUTPATIENT)
Dept: URBAN - METROPOLITAN AREA CLINIC 64 | Facility: CLINIC | Age: 62
End: 2017-12-04
Payer: COMMERCIAL

## 2017-12-04 VITALS
RESPIRATION RATE: 14 BRPM | DIASTOLIC BLOOD PRESSURE: 73 MMHG | RESPIRATION RATE: 9 BRPM | RESPIRATION RATE: 16 BRPM | SYSTOLIC BLOOD PRESSURE: 132 MMHG | SYSTOLIC BLOOD PRESSURE: 115 MMHG | WEIGHT: 173 LBS | HEART RATE: 61 BPM | DIASTOLIC BLOOD PRESSURE: 57 MMHG | HEART RATE: 65 BPM | DIASTOLIC BLOOD PRESSURE: 98 MMHG | HEART RATE: 84 BPM | SYSTOLIC BLOOD PRESSURE: 118 MMHG | DIASTOLIC BLOOD PRESSURE: 76 MMHG | HEIGHT: 66 IN | DIASTOLIC BLOOD PRESSURE: 65 MMHG | DIASTOLIC BLOOD PRESSURE: 62 MMHG | RESPIRATION RATE: 8 BRPM | HEART RATE: 67 BPM | TEMPERATURE: 98.1 F | HEART RATE: 63 BPM | SYSTOLIC BLOOD PRESSURE: 130 MMHG | SYSTOLIC BLOOD PRESSURE: 145 MMHG | OXYGEN SATURATION: 98 % | RESPIRATION RATE: 11 BRPM | OXYGEN SATURATION: 96 % | RESPIRATION RATE: 12 BRPM | OXYGEN SATURATION: 97 % | SYSTOLIC BLOOD PRESSURE: 112 MMHG | SYSTOLIC BLOOD PRESSURE: 151 MMHG | SYSTOLIC BLOOD PRESSURE: 119 MMHG | TEMPERATURE: 97.3 F | DIASTOLIC BLOOD PRESSURE: 63 MMHG

## 2017-12-04 DIAGNOSIS — D12.2 BENIGN NEOPLASM OF ASCENDING COLON: ICD-10-CM

## 2017-12-04 DIAGNOSIS — K64.1 SECOND DEGREE HEMORRHOIDS: ICD-10-CM

## 2017-12-04 DIAGNOSIS — Z83.71 FAMILY HISTORY OF COLONIC POLYPS: ICD-10-CM

## 2017-12-04 LAB
GI HISTOLOGY: A. UNSPECIFIED: (no result)
GI HISTOLOGY: PDF REPORT: (no result)

## 2017-12-04 PROCEDURE — 88305 TISSUE EXAM BY PATHOLOGIST: CPT | Performed by: INTERNAL MEDICINE

## 2017-12-04 PROCEDURE — 45380 COLONOSCOPY AND BIOPSY: CPT | Mod: 33 | Performed by: INTERNAL MEDICINE

## 2017-12-04 RX ADMIN — PROPOFOL 25 MG: 10 INJECTION, EMULSION INTRAVENOUS at 09:49

## 2017-12-04 RX ADMIN — PROPOFOL 25 MG: 10 INJECTION, EMULSION INTRAVENOUS at 09:51

## 2017-12-04 RX ADMIN — PROPOFOL 50 MG: 10 INJECTION, EMULSION INTRAVENOUS at 09:47

## 2017-12-04 RX ADMIN — PROPOFOL 25 MG: 10 INJECTION, EMULSION INTRAVENOUS at 09:53

## 2017-12-04 RX ADMIN — PROPOFOL 25 MG: 10 INJECTION, EMULSION INTRAVENOUS at 09:45

## 2017-12-04 RX ADMIN — LIDOCAINE HYDROCHLORIDE 50 MG: 10 INJECTION, SOLUTION EPIDURAL; INFILTRATION; INTRACAUDAL; PERINEURAL at 09:43

## 2017-12-04 RX ADMIN — PROPOFOL 100 MG: 10 INJECTION, EMULSION INTRAVENOUS at 09:43

## 2017-12-04 NOTE — SERVICEHPINOTES
COLBY LIVINNIE  presents for a "bypass" Colonoscopy for polyp surveillance.  She is aware of R/B/A as she has had prior scopes. Anesthesia provider has consented patient also.  Updated NP forms reviewed.

## 2018-01-03 ENCOUNTER — TELEPHONE (OUTPATIENT)
Dept: SURGERY | Facility: CLINIC | Age: 63
End: 2018-01-03

## 2018-01-03 NOTE — TELEPHONE ENCOUNTER
PER HUSBANDS REQUEST:  APPT INFORMATION WAS LEFT ON VOICEMAIL.    MAMMO AT [facility] ON 04/23/2018 @ 10:00  FOLLOW UP WITH JONATHANKER ON 04/30/2018 @ 900    ADVISED PT IF ANY QUESTIONS OR CONCERNS TO PLEASE CALL THE OFFICE.

## 2018-02-28 ENCOUNTER — TELEPHONE (OUTPATIENT)
Dept: FAMILY MEDICINE CLINIC | Facility: CLINIC | Age: 63
End: 2018-02-28

## 2018-02-28 NOTE — TELEPHONE ENCOUNTER
I tried to send it again. there still no pharmacy registering. can you just call it in.  Thanks, Z-Luis Carlos

## 2018-03-07 RX ORDER — LISINOPRIL AND HYDROCHLOROTHIAZIDE 12.5; 1 MG/1; MG/1
TABLET ORAL
Qty: 90 TABLET | Refills: 1 | Status: SHIPPED | OUTPATIENT
Start: 2018-03-07 | End: 2021-11-02 | Stop reason: HOSPADM

## 2018-04-09 RX ORDER — ALPRAZOLAM 0.5 MG/1
TABLET ORAL
Qty: 30 TABLET | Refills: 1 | Status: SHIPPED | OUTPATIENT
Start: 2018-04-09

## 2018-04-16 ENCOUNTER — HOSPITAL ENCOUNTER (OUTPATIENT)
Dept: GENERAL RADIOLOGY | Facility: HOSPITAL | Age: 63
Discharge: HOME | End: 2018-04-16
Admitting: INTERNAL MEDICINE

## 2018-04-16 ENCOUNTER — OFFICE VISIT (OUTPATIENT)
Dept: FAMILY MEDICINE CLINIC | Facility: CLINIC | Age: 63
End: 2018-04-16

## 2018-04-16 VITALS
BODY MASS INDEX: 29.94 KG/M2 | SYSTOLIC BLOOD PRESSURE: 112 MMHG | HEART RATE: 87 BPM | TEMPERATURE: 98.4 F | WEIGHT: 186.3 LBS | OXYGEN SATURATION: 96 % | HEIGHT: 66 IN | DIASTOLIC BLOOD PRESSURE: 72 MMHG

## 2018-04-16 DIAGNOSIS — E78.00 PURE HYPERCHOLESTEROLEMIA: Primary | ICD-10-CM

## 2018-04-16 DIAGNOSIS — G89.29 CHRONIC LOW BACK PAIN WITHOUT SCIATICA, UNSPECIFIED BACK PAIN LATERALITY: ICD-10-CM

## 2018-04-16 DIAGNOSIS — M54.50 CHRONIC LOW BACK PAIN WITHOUT SCIATICA, UNSPECIFIED BACK PAIN LATERALITY: ICD-10-CM

## 2018-04-16 DIAGNOSIS — I10 ESSENTIAL HYPERTENSION: ICD-10-CM

## 2018-04-16 DIAGNOSIS — F33.42 RECURRENT MAJOR DEPRESSIVE DISORDER, IN FULL REMISSION (HCC): ICD-10-CM

## 2018-04-16 LAB
ALBUMIN SERPL-MCNC: 4.8 G/DL (ref 3.5–5.2)
ALBUMIN/GLOB SERPL: 2.2 G/DL
ALP SERPL-CCNC: 40 U/L (ref 39–117)
ALT SERPL-CCNC: 17 U/L (ref 1–33)
AST SERPL-CCNC: 16 U/L (ref 1–32)
BASOPHILS # BLD AUTO: 0.02 10*3/MM3 (ref 0–0.2)
BASOPHILS NFR BLD AUTO: 0.4 % (ref 0–1.5)
BILIRUB SERPL-MCNC: 0.2 MG/DL (ref 0.1–1.2)
BUN SERPL-MCNC: 15 MG/DL (ref 8–23)
BUN/CREAT SERPL: 22.4 (ref 7–25)
CALCIUM SERPL-MCNC: 9.9 MG/DL (ref 8.6–10.5)
CHLORIDE SERPL-SCNC: 103 MMOL/L (ref 98–107)
CHOLEST SERPL-MCNC: 179 MG/DL (ref 0–200)
CO2 SERPL-SCNC: 25.7 MMOL/L (ref 22–29)
CREAT SERPL-MCNC: 0.67 MG/DL (ref 0.57–1)
EOSINOPHIL # BLD AUTO: 0.22 10*3/MM3 (ref 0–0.7)
EOSINOPHIL NFR BLD AUTO: 4.4 % (ref 0.3–6.2)
ERYTHROCYTE [DISTWIDTH] IN BLOOD BY AUTOMATED COUNT: 12.6 % (ref 11.7–13)
GFR SERPLBLD CREATININE-BSD FMLA CKD-EPI: 108 ML/MIN/1.73
GFR SERPLBLD CREATININE-BSD FMLA CKD-EPI: 89 ML/MIN/1.73
GLOBULIN SER CALC-MCNC: 2.2 GM/DL
GLUCOSE SERPL-MCNC: 120 MG/DL (ref 65–99)
HCT VFR BLD AUTO: 43 % (ref 35.6–45.5)
HDLC SERPL-MCNC: 50 MG/DL (ref 40–60)
HGB BLD-MCNC: 13.9 G/DL (ref 11.9–15.5)
IMM GRANULOCYTES # BLD: 0 10*3/MM3 (ref 0–0.03)
IMM GRANULOCYTES NFR BLD: 0 % (ref 0–0.5)
LDLC SERPL CALC-MCNC: 105 MG/DL (ref 0–100)
LYMPHOCYTES # BLD AUTO: 1.56 10*3/MM3 (ref 0.9–4.8)
LYMPHOCYTES NFR BLD AUTO: 31.1 % (ref 19.6–45.3)
MCH RBC QN AUTO: 30.8 PG (ref 26.9–32)
MCHC RBC AUTO-ENTMCNC: 32.3 G/DL (ref 32.4–36.3)
MCV RBC AUTO: 95.3 FL (ref 80.5–98.2)
MONOCYTES # BLD AUTO: 0.37 10*3/MM3 (ref 0.2–1.2)
MONOCYTES NFR BLD AUTO: 7.4 % (ref 5–12)
NEUTROPHILS # BLD AUTO: 2.85 10*3/MM3 (ref 1.9–8.1)
NEUTROPHILS NFR BLD AUTO: 56.7 % (ref 42.7–76)
PLATELET # BLD AUTO: 287 10*3/MM3 (ref 140–500)
POTASSIUM SERPL-SCNC: 4.2 MMOL/L (ref 3.5–5.2)
PROT SERPL-MCNC: 7 G/DL (ref 6–8.5)
RBC # BLD AUTO: 4.51 10*6/MM3 (ref 3.9–5.2)
SODIUM SERPL-SCNC: 142 MMOL/L (ref 136–145)
T4 FREE SERPL-MCNC: 0.96 NG/DL (ref 0.93–1.7)
TRIGL SERPL-MCNC: 119 MG/DL (ref 0–150)
TSH SERPL DL<=0.005 MIU/L-ACNC: 2.26 MIU/ML (ref 0.27–4.2)
VLDLC SERPL CALC-MCNC: 23.8 MG/DL (ref 5–40)
WBC # BLD AUTO: 5.02 10*3/MM3 (ref 4.5–10.7)

## 2018-04-16 PROCEDURE — 72100 X-RAY EXAM L-S SPINE 2/3 VWS: CPT

## 2018-04-16 PROCEDURE — 99214 OFFICE O/P EST MOD 30 MIN: CPT | Performed by: INTERNAL MEDICINE

## 2018-04-16 RX ORDER — VORTIOXETINE 10 MG/1
TABLET, FILM COATED ORAL
Refills: 0 | COMMUNITY
Start: 2018-04-05

## 2018-04-16 NOTE — PROGRESS NOTES
"Subjective   Patient ID: Katheryn Andersen is a 62 y.o. female presents with   Chief Complaint   Patient presents with   • Hyperlipidemia     medication follow up       HPI - This patient presents today for treatment of hyperlipidemia, hypertension and depression.  She's doing well and feels good.  She has had some low back pain that is intermittent and does not radiate it's mild to moderate in intensity.    Assessment plan    Hypercholesterolemia simvastatin 40 we'll check a fasting lipid profile and a CMP    Hypertension controlled with lisinopril hydrochlorothiazide    Depression controlled with Trental X    Insomnia Ambien 5 mg by mouth daily at bedtime when necessary    Chronic back pain x-ray of the lumbar spine    Allergies   Allergen Reactions   • Other        The following portions of the patient's history were reviewed and updated as appropriate: allergies, current medications, past family history, past medical history, past social history, past surgical history and problem list.      Review of Systems   Constitutional: Negative.    HENT: Negative.    Eyes: Negative.    Respiratory: Negative.    Cardiovascular: Negative.    Gastrointestinal: Negative.    Endocrine: Negative.    Genitourinary: Negative.    Musculoskeletal: Positive for back pain.   Skin: Negative.    Allergic/Immunologic: Negative.    Neurological: Negative.    Hematological: Negative.    Psychiatric/Behavioral: Negative.        Objective     Vitals:    04/16/18 1016   BP: 112/72   BP Location: Left arm   Patient Position: Sitting   Cuff Size: Adult   Pulse: 87   Temp: 98.4 °F (36.9 °C)   TempSrc: Oral   SpO2: 96%   Weight: 84.5 kg (186 lb 4.8 oz)   Height: 167.6 cm (65.98\")         Physical Exam   Constitutional: She is oriented to person, place, and time. She appears well-developed and well-nourished.   HENT:   Head: Normocephalic and atraumatic.   Mouth/Throat: Oropharynx is clear and moist.   Eyes: Conjunctivae and EOM are normal. Pupils " are equal, round, and reactive to light.   Neck: Neck supple. No thyromegaly present.   Cardiovascular: Normal rate, regular rhythm and normal heart sounds.    Pulmonary/Chest: Effort normal and breath sounds normal.   Abdominal: Soft. She exhibits no distension. There is no tenderness.   Musculoskeletal: Normal range of motion. She exhibits no edema.      During the foot exam she had a monofilament test not performed.  Neurological: She is alert and oriented to person, place, and time.   Skin: Skin is warm, dry and intact.   Psychiatric: She has a normal mood and affect. Judgment normal.   Nursing note and vitals reviewed.        Katheryn was seen today for hyperlipidemia.    Diagnoses and all orders for this visit:    Pure hypercholesterolemia  -     Lipid Panel  -     Comprehensive Metabolic Panel  -     TSH+Free T4  -     CBC & Differential    Essential hypertension  -     Lipid Panel  -     Comprehensive Metabolic Panel  -     TSH+Free T4  -     CBC & Differential    Chronic low back pain without sciatica, unspecified back pain laterality  -     XR Spine Lumbar 2 or 3 View  -     Lipid Panel  -     Comprehensive Metabolic Panel  -     TSH+Free T4  -     CBC & Differential    Recurrent major depressive disorder, in full remission  -     Lipid Panel  -     Comprehensive Metabolic Panel  -     TSH+Free T4  -     CBC & Differential        Call or return to clinic prn if these symptoms worsen or fail to improve as anticipated.

## 2018-04-17 DIAGNOSIS — M54.50 CHRONIC LOW BACK PAIN WITHOUT SCIATICA, UNSPECIFIED BACK PAIN LATERALITY: Primary | ICD-10-CM

## 2018-04-17 DIAGNOSIS — G89.29 CHRONIC LOW BACK PAIN WITHOUT SCIATICA, UNSPECIFIED BACK PAIN LATERALITY: Primary | ICD-10-CM

## 2018-04-17 RX ORDER — TRAZODONE HYDROCHLORIDE 100 MG/1
TABLET ORAL
Qty: 90 TABLET | Refills: 1 | Status: SHIPPED | OUTPATIENT
Start: 2018-04-17

## 2018-04-23 ENCOUNTER — APPOINTMENT (OUTPATIENT)
Dept: WOMENS IMAGING | Facility: HOSPITAL | Age: 63
End: 2018-04-23

## 2018-04-23 PROCEDURE — 77067 SCR MAMMO BI INCL CAD: CPT | Performed by: RADIOLOGY

## 2018-04-23 PROCEDURE — 77063 BREAST TOMOSYNTHESIS BI: CPT | Performed by: RADIOLOGY

## 2018-04-24 ENCOUNTER — TELEPHONE (OUTPATIENT)
Dept: SURGERY | Facility: CLINIC | Age: 63
End: 2018-04-24

## 2018-04-30 ENCOUNTER — OFFICE VISIT (OUTPATIENT)
Dept: SURGERY | Facility: CLINIC | Age: 63
End: 2018-04-30

## 2018-04-30 VITALS
WEIGHT: 181 LBS | BODY MASS INDEX: 29.09 KG/M2 | HEART RATE: 88 BPM | DIASTOLIC BLOOD PRESSURE: 85 MMHG | HEIGHT: 66 IN | OXYGEN SATURATION: 98 % | SYSTOLIC BLOOD PRESSURE: 124 MMHG

## 2018-04-30 DIAGNOSIS — R92.1 BREAST CALCIFICATIONS ON MAMMOGRAM: Primary | ICD-10-CM

## 2018-04-30 DIAGNOSIS — N60.19 FIBROCYSTIC BREAST DISEASE (FCBD), UNSPECIFIED LATERALITY: ICD-10-CM

## 2018-04-30 DIAGNOSIS — D24.9 PAPILLOMA OF BREAST, UNSPECIFIED LATERALITY: ICD-10-CM

## 2018-04-30 DIAGNOSIS — Z80.3 FH: BREAST CANCER: ICD-10-CM

## 2018-04-30 PROCEDURE — 99212 OFFICE O/P EST SF 10 MIN: CPT | Performed by: SURGERY

## 2018-04-30 NOTE — PROGRESS NOTES
Chief Complaint: Katheryn Andersen is a 63 y.o. female who was seen in consultation at the request of No ref. provider found  for Breast calcifications on mammogram and a followup visit    History of Present Illness:   Ms Andersen began having bilateral nipple discharge about one year ago.  She describes this as bilateral ,spontaneous, occurring intermittently, about once every 1-2 months.  SHe describes that the right discharge was clear or brown, and that the left was initially brown but then became frankly bloody.  She stopped her premarin about 4 weeks before her breast MRI.  SHe has had hot flashes since. She denies any masses, skin  changes, or nipple chages other than the discharge in either breast.  She denies any lumps in either axilla or in her neck.  Her  imaging after presentation included a bilateral screening mammogram 2/3/9 at [facility] that showed stable punctate calcifications with a diffuse scattered pattern, BR2.   Due to the nipple discharge, she then underwent a bilateral breast MRI 12/2/9 at [facility].  This showed in the LEFT breast a group of linear, clumped enhancement within the RA region (about 2.2 cm posterior to the nipple), spanning 2.5 cm diameter with no mammographic correlate. On the RIGHT, the MRI showed at 12:00 an about 1.4cm mass, 5CFN, superior to the nipple- resmbling a FA, but with some above threshold enhancement and rapid washout with recs for US. SHe had that ultrasound performed, which showed at the 12:00 4 CFN location a 9x5mm solid lesion, BR3.   SHe then undernwent an MRI guided biopsy 12/16/9 LEFT breast, the path from which returned as florid hyperplasia, papillomas, focal sclerosis, possibly representing a radial sclerosing lesion,and was felt to be concordant.  Her post biopsy mammogram showed a biopsy in the RA region. We then identified the RIGHT breast 12:00 lesion 5CFN on ultrasound and perfomred an US-guided breast biopsy of the solid lesion at the  RIGHT12:00 location that returned as a fibroadenoma.  2/4/10 I took Ms Andersen to the operating room for a needle localized excisional biopsy of the LEFT breast RA papilloma and radial sclerosing lesion.  The pathology from this returned as papilloma, usual hyperplasia, columnar cell change without atypia, sclerosing adenosis, focal radial scar, and microcysts.  She is here for her followup with no complaints except the expected discomfort at her incision. She denies any redness, warmth, or drainage from her wound.  She has a FH significant for colon cancer (see details below)-- she does have one maternal cousin with breast cancer diagnosed at age 36, who  from her breast cancer.  Mrs. Andersen postoperative bilateral mammogram was read as a BR2, with post operative changes on the LEFT and negative for findings on the RIGHT.      Mrs. Andersen's father passed away from compliactions of COPD. Her daugher who has Crohns disease is stable and is trying to get pregnant, and she has been trying to get her mother into a patio home now that her father has passed.    Mrs. Andersen mammogram from 3-15-11showed interval development of calcifications 6:00 RIGHT middle third slightly retroareolar location, rec stereo. LEFT breast no abnormal findings. I cancelled her 3-22 followup and scheduled her for a stereo biopsy in the interim with post biopsy visit. She is here today for that procedure.    Mrs. Andersen has done well since her RIGHT stereo biopsy on 11. She did have some post procedure bruising, but this is resolving.  She recently returned from [facility] from a vacation.   Her pathology returned as duct hyperplasia without atypia, CCC, apocrine metaplasia, focal adenosis, and microcyst.  Mrs. Andersen has been doing well since we last visited. No new masses, skin changes, nipple discharge either breast. She had reported RIGHT discharge in the past that was umpredictable, and infrequent, nonbloody. She has noted no further  in the past 6 months.  Her most recent imaging includes a 6 mo FU from RIGHT biopsy for benign disease:    [id number] Right Digital Diagnostic Mammography  Impression: Benign postbiopsy findings of the right breast.  BIRADS CATEGORY 2: Benign    Since our last visit, Katheryn tells me that she has started having RIGHT nipple drainage again. This time spontaneous. She has noted it 3 x in her bra, each time clear.   She has had no additional symtoms- no masses, skin changes in either breast and no drainage LEFT nipple.  Her most recent imaging includes   03-05-12 Screening mammogram bilateral [facility] Katheryn Andersen  Impression: Stable benign mammogram.   BIRADS Category 1    She has had a significant weight gain over the past year- ~ 20 #.    Since our last visit, Katheryn had a needle biopsy of the RIGHT RA lesion that returned as papilloma, see below. She had moderate to significant bruising from that procedure.    On 5-31-12, I excised a papilloma RIGHT breast.  She has done well since that procedure and has note dno sugns of infection. Her pathology returned as papilloma and 2 radial sclerosing lesions as well as FCC and microcysts, see below.    Since our last visit, Mrs. Andersen has done well. She has noted no changes in her exam. No new masses, skin changes, nipple changes, nipple discharge .  Her most recent imaging includes  09-25-12         Right Breast Sonography          [facility]         Katheryn Andersen  Findings:  Sonographic evaluation of area of surgical excision of the papilla was performed.  corresponds  to the 12:00 o'clock  position  retroareolar.  At this location there is a 1. 5 x 1. 4 x 0. 9 cm  postoperative fluid collection.  BIRADS  Category  2:   Benign    She has started an a [place] diet to lose weight. Her weight today is stable from her last visit, at 180#. Her daughter is planning to start IVF soon.    Since our last visit, Mrs. Andersen has done well.   She has had no  further drainage from either nipple.  No new masses, skin changes, niple changes either breast.  Her daughter failed 2 tries with IUI and then her first trial of IVF.    Her most recent imaging includes the followin13      Digital Screening Mammogram     [facility]     Stable fibroglandular pattern.   Scattered fibroglandular densities.   CONCLUSION: BIRADS CATEGORY 2: Benign     In the interim, Mrs. Andersen has done well.  She has noted no changes in her breast exam. No new masses, skin changes, niplpe changes, nipple discharge either breast.  Her most recenti maging includes the followin14      [facility]       Bilateral Mammography         Katheryn Andersen   There are scattered fibroglandular densities.   Finding 1: Stable punctate calcifications both breasts.   Finding 2: Isodense focal asymmetry left breast at 9 o'clock 7 centimeters from the nipple.   IMPRESSION:  Finding 1: Benign Negative   Finding 2:   BIRADS Category 0    14       [facility]       Left Diag Mammogram with Tomosynthesis        Katheryn Andersen   There are scatted fibroglandular densities.   Finding 1: Additional evaluation was performed for the focal asymmetry. Isodense focal asymmetry in the left breast at 10 o'clock located 7 centimeters from the nipple.   LEFT BREAST ULTRASOUND:  Finding 1: Oval elongated complex cyst 13 millimeters.   Finding 2: Oval elongated complicated cyst versus solid mass with circumscribed margins measuring 6 millimeters seen in the left breast at 9 o'clock.   IMPRESSION:   Finding 1: Complex cyst in the left breast is suspicious.   Finding 2: Complicated cyst versus solid mass in the left breast at 9 o'clock is suspicious.   BIRADS category 4A: Suspicious Abnormality    SHe then had the following biopsy at [facility]-    14       [facility]        Left Ultrasound Guided Biopsy          Katheryn Andersen   9:00 left breast.   12 gauge core needle device.   A total of 7 cores.   Minicork shaped  s-francesca tissue marker was placed at the biopsy site.   ADDENDUM-  9:00 left breast has returned intraductal papilloma.   The lesion appeared to be completely removed with the needle biopsy. Pathology is concordant.   Post clip mammogram demonstrates good position of the mini cork shaped clip. It is surrounded by a 2.2 cm mass consistent with a hematoma.     14       [facility]           Pathology Results             Katheryn Andersen   Diagnosis:   Intraductal papilloma with florid intraductal hyperplasia of the usual type.   Fibrofatty breast tissue also showing columnar cell hyperplasia, cystically dilated and ectatic ducts, apocrine metaplasia, and patchy mild chronic stromal inflammation.     She has her first new grandson, Tashi, who is 4 months. Her daughter had postpartum cardiomyopathy and has EF < 30% and has a defibrillator.     In the interim,  Mrs. Andersen  has done well.  She has noted no changes in her breast exam. No new masses, skin changes,  nipple changes, nipple discharge either breast.     Her most recent imaging includes the followin14          [facility]          LEFT BREAST ULTRASOUND           Katheryn Andersen  Finding 1: follow up focal asymmetry in the left breast, 10 o'clock seen is no evidence of any solid mass or abnormal cystic elements.   Finding 2: Followup complicated cyst versus solid mass left breast, 9 o'clock. 6 millimeters in the left breast at 9 o'clock. Mass is vascular. Does not persist.   Finding 3: There are two simple cysts seen in the left breast.   IMPRESSION:  Finding 1: Benign Negative  Finding 2: Benign Negative   Finding 3: Benign Negative   BIRADS Category 2: Benign     We ordered a LEFT mammogram, and we called [facility] when we received the report of an US. [facility] felt that an US was more appropriate- pt confirmed this today. Therefore, I did not have her return for mammogram.  She has stopped her estrace.      In the interim,  Mrs. Andersen  has done well.  She has noted no  changes in her breast exam. No new masses, skin changes,  nipple changes, nipple discharge either breast.     Her most recent imaging includes the followin/09/15         [facility]         Bilateral Screening Mammogram with Tomosynthesis          Katheryn Andersen  Yearly screening mammogram.  The breasts are heterogeneously dense.  There are new punctate calcifications with grouped distribution seen in the right breast at 5 o'clock.  IMPRESSION:  Birads Category 0    04/14/15          [facility]            RIGHT DIAGNOSTIC MAMMOGRAPHY         Katheryn Andersen  The breast is heterogeneously dense.   Finding 1: Additional evaluation calcifications in the right breast, 5 o'clock 9 millimeters with clustered distribution in the anterior one-third region of the right breast at 5 o'clock. Increased number of calcifications.   Finding 2: Multiple punctate and fine granular calcifications measuring 9 millimeters clustered middle one-third lower inner region of the right breast.   Finding 3: Multiple fine granular calcifications 9 millimeters clustered anterior one-third upper outer region of the right breast.   IMPRESSION;   Finding 1: Calcifications anterior one-third region right breast at 5 o'clock are suspicious.   Finding 2: Calcification middle one-third lower inner region right breast are suspicious.   Finding 3: Anterior one-third upper outer region of the right breast are probably benign. Follow up mammography in 6 months.   BIRADS category 4A    She then had the following biopsies:    04/16/15        [facility]                 Pathology report                   katheryn Andersen   DIAGNOSIS  1. Right breast tissue, 5:00, stereotactic biopsy;  benign usual duct hyperplasia and columnar cell hyperplasia with associated microcalcifications; Intraluminal microcalcifications; Aprocrine meta plasia; and cystically dilated and ectatic ducts.  2. Right breast tissue, lower inner quadrant, stereotactic biopsy:  benign columnar cell hyperplasia  "with associated microcalcifications; usual duct hyperplasia; sclerosing adenose with microcalcifications; ductal dilatation and ectasia; and apocrine metaplasia.     04/16/15         [facility]        Stereotactic Biopsy          Katheryn Andersen  12 core needle biopys specimens a 9 guage vacuum assisted device. A(n) hourglass shaped tri-francesca tissue marker was deplayed within the biopsy bed.  A follow up mammogram in 6 months is recommended.  ADDENDUM:  Right 6 month follow-up mammogram is recommended. Pathology is concordant.   Post-clip mammogram demonstrates good postition of the \"hour-glass\" shaped clip. The \"hour-glass\" shapped clip is the most anterior and medial. No significant hemotoma formation.     04/16/15        [facility]        Stereotactic Biopsy          Katheryn Andersen  Right breast, 12 core needle biopsy specimens a 9 gauge vacuum assisted device. A top hat shaped s-francesca eviva tissue marker was deployed within the biopsy bed.   A follow up mammogram in 6 month is recommended.   ADDENDUM:  Pathology is concordant. The clip from this biopsy is a \"top-hat\" shaped clip. The \"top-hat\" shaped clip is deep to the nipple and the most posterior of the four clips. \"Top-hat\" shaped clip is the most posterior and the most inferior of the four clips.    She reports bruising RIGHT breast.      In the interim,  Mrs. Andersen has done well.  She has noted no changes in her breast exam. No new masses, skin changes,  nipple changes, nipple discharge either breast.       Her most recent imaging includes the following:  10/14/15        [facility]     Right Breast Diagnostic Mammogram with Tomosynthesis                              Katheryn Andersen  The breast is heterogeneously dense.  Finding 1:  Follow-up calcifications in the right breast,5 o'clock.   in an area of prior needle biopsy. a decreased number of calcifications.  Finding 2:  Follow-up calcifications in the right breast,lower inner   few stable punctate and " very faint calcifications in the middle one-third lower inner region of the right breast..  in an area of prior needle biopsy. decreased number of calcifications.  Finding 3:  Follow-up  calcifications in the right breast, upper outer. grouped distribution in the anterior one-third upper outer region of the right breast.  These demonstrate morphology similar to the groups which were biopsied.  There has been no significant change.  IMPRESSION:  Finding 1:  Benign-negative.  Finding 2:  Benign-negative  Finding 3:  Stable punctate calcifications in the anterior one-third upper outer region of the right breast are probably benign.  A 6 month follow-up diagnostic mammogram is recommended.  BI-RADS Category 3- Probably benign    She has gained 7#, at 185 today.    In the interim,  Katheryn Ledadonny  has done well.  She has noted no changes in her breast exam. No new masses, skin changes, nipple changes, nipple discharge either breast.     Her most recent imaging includes the following:  [facility].  The breast are heterogenous leg dense.  Follow-up right upper outer breast calcifications seen in the anterior to posterior third, no suspicious forms, no change.  Stable calcifications central right breast.  BI-RADS 3 for 6 month mammogram.      In the interim,  Katheryn Ganesh  has done well.  She has noted no changes in her breast exam. No new masses, skin changes, nipple changes, nipple discharge either breast.   She denies headache, bone pain, belly pain, cough, changes in vision or gait.  Her most recent imaging includes the following:  [facility] October [facility] 25th 2016.  Right diagnostic mammogram with 3-D.  Follow up calcifications in the right breast, upper outer.  No suspicious forms.  No significant change.  Right breast ultrasound was benign.  BI-RADS 2.        In the interim,  Katheryn Ganesh  has done well.  She has noted no changes in  her breast exam. No new masses, skin changes, nipple changes, nipple discharge either breast.     Her most recent imaging includes the following:  [facility] 4-18-17.  BIlateral screening mammogram with 3-D.    BI-RADS 2.  She has started yoga and has lost 3#/    Interval History:  In the interim,  Katheryn Andersen  has done well.  She has noted no changes in her breast exam. No new masses, skin changes, nipple changes, nipple discharge either breast.     Her most recent imaging includes the following:  [facility] 4-23-18-  Bilateral screen with 3D- het dense- BR2  Her weight is stable.      Review of Systems:  Review of Systems   Constitutional: Positive for unexpected weight change (1 lb wt gain).   All other systems reviewed and are negative.       Past Medical and Surgical History:  Breast Biopsy History:  Patient has had the following breast biopsies:She had one on the LEFT side 12/16/9. The pathology from this biopsy was papilloma, radial sclerosing lesion.  Breast Cancer HIstory:  Patient does not have a past medical history of breast cancer.  Breast Operations, and year:  None   Obstetric/Gynecologic History:  Age menstrual periods began:13  Patient is postmenopausal due to removal of her uterus in the following year: 2005   Number of pregnancies:2  Number of live births: 2  Number of abortions or miscarriages: 0  Age of delivery of first child: 28  Patient did not breast feed.  Length of time taking birth control pills:12 yrs   Patient took hormone replacement during the following dates: 3 yrs     Past Surgical History:   Procedure Laterality Date   • FOOT ARTHROPLASTY     • HYSTERECTOMY     • REFRACTIVE SURGERY         Past Medical History:   Diagnosis Date   • Hyperlipidemia    • Hypertension    • Insomnia        Prior Hospitalizations, other than for surgery or childbirth, and year:  n/a      Social History     Social History   • Marital status:      Spouse name: N/A   • Number of children:  "N/A   • Years of education: N/A     Occupational History   • Not on file.     Social History Main Topics   • Smoking status: Never Smoker   • Smokeless tobacco: Never Used   • Alcohol use Yes      Comment: social   • Drug use: No   • Sexual activity: Defer     Other Topics Concern   • Not on file     Social History Narrative   • No narrative on file     Patient is .  Patient is a homemaker.  Patient drinks 1 servings of caffeine per day.      Family History:  Family History   Problem Relation Age of Onset   • Diabetes Mother    • Hypertension Mother    • Hyperlipidemia Mother    • COPD Father    • Hypertension Father    • Hyperlipidemia Father    • Diabetes Sister    • Mental retardation Sister    • Stroke Maternal Grandmother    • Stroke Paternal Grandmother    • Diabetes Paternal Grandmother    • Breast cancer Cousin 36     MATERNAL 1ST COUSIN    • Colon cancer Maternal Uncle      70S   • Colon cancer Maternal Uncle      70S   • Colon cancer Cousin      MATERNAL COUSIN   • Colon cancer Cousin      MATERNAL COUSIN   • Colon cancer Cousin      MATERNAL COUSIN       Vital Signs:  /85   Pulse 88   Ht 167.6 cm (66\")   Wt 82.1 kg (181 lb)   SpO2 98%   BMI 29.21 kg/m²      Medications:    Current Outpatient Prescriptions:   •  ALPRAZolam (XANAX) 0.5 MG tablet, take 1 tablet by mouth at bedtime if needed for anxiety, Disp: 30 tablet, Rfl: 1  •  aspirin 81 MG tablet, Take by mouth daily., Disp: , Rfl:   •  conjugated estrogens (PREMARIN) 0.625 MG/GM vaginal cream, Insert  into the vagina Daily., Disp: 30 g, Rfl: 2  •  FLONASE ALLERGY RELIEF 50 MCG/ACT nasal spray, instill 2 sprays into each nostril once daily, Disp: , Rfl: 0  •  ibuprofen (ADVIL,MOTRIN) 400 MG tablet, , Disp: , Rfl: 0  •  lisinopril-hydrochlorothiazide (PRINZIDE,ZESTORETIC) 10-12.5 MG per tablet, take 1 tablet by mouth once daily, Disp: 90 tablet, Rfl: 1  •  loratadine-pseudoephedrine (CLARITIN-D 24 HOUR)  MG per 24 hr tablet, Take " 1 tablet by mouth Daily. Use generic if available, Disp: 90 tablet, Rfl: 1  •  simvastatin (ZOCOR) 40 MG tablet, Take 1 tablet by mouth Every Night., Disp: 90 tablet, Rfl: 1  •  traZODone (DESYREL) 100 MG tablet, take 1 tablet by mouth at bedtime, Disp: 90 tablet, Rfl: 1  •  TRINTELLIX 10 MG tablet, , Disp: , Rfl: 0  •  zolpidem (AMBIEN) 5 MG tablet, take 1 tablet by mouth at bedtime if needed for sleep, Disp: 90 tablet, Rfl: 0     Allergies:  Allergies   Allergen Reactions   • Other        Physical Examination:  General Appearance:  Patient is in no distress.  She is well kept and has an  overweight  build.   Psychiatric:  Patient with appropriate mood and affect. Alert and oriented to self, time, and place.    Breast, RIGHT:  medium  sized, symmetric with the contralateral side.  Breast skin is without erythema, edema, rashes bilaterally.  There are no visible abnormalities upon inspection during the arm-raising maneuver or with hands on hips in the sitting position. There is no nipple retraction or nipple/areolar skin changes. There is a well healed periareolar incision 12:00 and UIQ from 5-2012 excision of papilloma on core. There are no masses palpable in the sitting or supine positions.      Breast, LEFT:   medium  sized, symmetric with the contralateral side.  Breast skin is without erythema, edema, rashes bilaterally.  She has a well healed curvilinear incision at the upper inner periareolar location 9-12:00.  There is no nipple retraction, no discharge or nipple/areolar skin changes.  There are no masses palpable in the sitting or supine positions.     Lymphatic:  There is no axillary, cervical, infraclavicular, or supraclavicular adenopathy bilaterally.  Eyes:  Pupils are round and reactive to light.  Cardiovascular:  Heart rate and rhythm are regular.  Respiratory:  Lungs are clear bilaterally with no crackles or wheezes in any lung field.  Gastrointestinal:  Abdomen is soft, nondistended, and nontender.  There are no scars from previous surgery.   Musculoskeletal:  Good strength in all 4 extremities.  There is good range of motion in both shoulders.   Skin:  No new skin lesions or rashes on the skin excluding the breast (see breast exam above).          Imaging:   3-15-11[facility] mammogram bilateral- showed interval development of calcifications 6:00 RIGHT middle third slightly retroareolar location, rec stereo. LEFT breast no abnormal findings.     [id number] Right Digital Diagnostic Mammography [facility]  Impression: Benign postbiopsy findings of the right breast.  BIRADS CATEGORY 2: Benign    03-05-12 Screening mammogram bilateral [facility] Katheryn Andersen  Impression: Stable benign mammogram.   BIRADS Category 1    03-13-12 Right breast sonography [facility] Katheryn Andersen  Impression: 0.7 cm retroareolar right breast mass at the 12 o'clock position that has features suggestive of an intraductal location. This may represent a papilloma.   BIRADS Category 4    09-25-12         Right Breast Sonography          [facility]         Katheryn Andersen  Findings:  Sonographic evaluation of area of surgical excision of the papilla was performed.  corresponds  to the 12:00 o'clock  position  retroareolar.  At this location there is a 1. 5 x 1. 4 x 0. 9 cm  postoperative fluid collection.  BIRADS  Category  2:   Benign    03/06/13      Digital Screening Mammogram     [facility]       Katheryn Andersen   Stable fibroglandular pattern.   Scattered fibroglandular densities.   CONCLUSION: BIRADS CATEGORY 2: Benign     04/07/14      [facility]       [facility]         [facility]   There are scattered fibroglandular densities.   Finding 1: Stable punctate calcifications both breasts.   Finding 2: Isodense focal asymmetry left breast at 9 o'clock 7 centimeters from the nipple.   IMPRESSION:  Finding 1: Benign Negative   Finding 2:   BIRADS Category 0    04/21/14       [facility]       Left Diag Mammogram with Tomosynthesis        Ganesh  Katheryn   There are scatted fibroglandular densities.   Finding 1: Additional evaluation was performed for the focal asymmetry. Isodense focal asymmetry in the left breast at 10 o'clock located 7 centimeters from the nipple.   LEFT BREAST ULTRASOUND:  Finding 1: Oval elongated complex cyst 13 millimeters.   Finding 2: Oval elongated complicated cyst versus solid mass with circumscribed margins measuring 6 millimeters seen in the left breast at 9 o'clock.   IMPRESSION:   Finding 1: Complex cyst in the left breast is suspicious.   Finding 2: Complicated cyst versus solid mass in the left breast at 9 o'clock is suspicious.   BIRADS category 4A: Suspicious Abnormality    12/18/14          [facility]          LEFT BREAST ULTRASOUND           Katheryn Eberenz  Finding 1: follow up focal asymmetry in the left breast, 10 o'clock seen is no evidence of any solid mass or abnormal cystic elements.   Finding 2: Followup complicated cyst versus solid mass left breast, 9 o'clock. 6 millimeters in the left breast at 9 o'clock. Mass is vascular. Does not persist.   Finding 3: There are two simple cysts seen in the left breast.   IMPRESSION:  Finding 1: Benign Negative  Finding 2: Benign Negative   Finding 3: Benign Negative   BIRADS Category 2: Benign     04/09/15         [facility]         Bilateral Screening Mammogram with Tomosynthesis          Randy Andersenen  Yearly screening mammogram.  The breasts are heterogeneously dense.  There are new punctate calcifications with grouped distribution seen in the right breast at 5 o'clock.  IMPRESSION:  Birads Category 0    04/14/15          [facility]            RIGHT DIAGNOSTIC MAMMOGRAPHY         EbKatheryn zavala  The breast is heterogeneously dense.   Finding 1: Additional evaluation calcifications in the right breast, 5 o'clock 9 millimeters with clustered distribution in the anterior one-third region of the right breast at 5 o'clock. Increased number of calcifications.   Finding 2: Multiple punctate and fine  granular calcifications measuring 9 millimeters clustered middle one-third lower inner region of the right breast.   Finding 3: Multiple fine granular calcifications 9 millimeters clustered anterior one-third upper outer region of the right breast.   IMPRESSION;   Finding 1: Calcifications anterior one-third region right breast at 5 o'clock are suspicious.   Finding 2: Calcification middle one-third lower inner region right breast are suspicious.   Finding 3: Anterior one-third upper outer region of the right breast are probably benign. Follow up mammography in 6 months.   BIRADS category 4A    10/14/15        [facility]     Right Breast Diagnostic Mammogram with Tomosynthesis                              Katheryn Eberenz  The breast is heterogeneously dense.  Finding 1:  Follow-up calcifications in the right breast,5 o'clock.   in an area of prior needle biopsy. a decreased number of calcifications.  Finding 2:  Follow-up calcifications in the right breast,lower inner   few stable punctate and very faint calcifications in the middle one-third lower inner region of the right breast..  in an area of prior needle biopsy. decreased number of calcifications.  Finding 3:  Follow-up  calcifications in the right breast, upper outer. grouped distribution in the anterior one-third upper outer region of the right breast.  These demonstrate morphology similar to the groups which were biopsied.  There has been no significant change.  IMPRESSION:  Finding 1:  Benign-negative.  Finding 2:  Benign-negative  Finding 3:  Stable punctate calcifications in the anterior one-third upper outer region of the right breast are probably benign.  A 6 month follow-up diagnostic mammogram is recommended.  BI-RADS Category 3- Probably benign    10/14/15        [facility]     Right Breast Diagnostic Mammogram with Tomosynthesis                              Katheryn Eberenz  The breast is heterogeneously dense.  Finding  1:  Follow-up calcifications in the right breast,5 o'clock.   in an area of prior needle biopsy. a decreased number of calcifications.  Finding 2:  Follow-up calcifications in the right breast,lower inner   few stable punctate and very faint calcifications in the middle one-third lower inner region of the right breast..  in an area of prior needle biopsy. decreased number of calcifications.  Finding 3:  Follow-up  calcifications in the right breast, upper outer. grouped distribution in the anterior one-third upper outer region of the right breast.  These demonstrate morphology similar to the groups which were biopsied.  There has been no significant change.  IMPRESSION:  Finding 1:  Benign-negative.  Finding 2:  Benign-negative  Finding 3:  Stable punctate calcifications in the anterior one-third upper outer region of the right breast are probably benign.  A 6 month follow-up diagnostic mammogram is recommended.  BI-RADS Category 3- Probably benign    04-15-16                    [facility]                 BILATERIAL DIAGNOSTIC MAMMOGRAM            EBERENZ, JOSE A  BILATERAL DIAGNOSTIC MAMMOGRAM WITH TOMOSYNTHESIS  The breasts are heterogeneously dense.  Finding 1: Calcifications in the right breast upper anterior thirds of the breast. No suspicious forms are seen no significant change.  Finding 2. Stable calcifications seen region of the right breast.  IMPRESSIONS:  Finding 1. Stable probably benign. Follow-up mammography in 6 months is recommended. Follow-up ultrasound of the right breast in 6 months is recommended.  Finding 2. Benign- negative  BI-RADS Category 3: Probably Benign    10-25-16                          [facility] RIGHT DIAG MAMMO WITH TOMOSYNTHESIS              EBILIR, JOSE A  The breast is heterogeneously dense. Follow-up calcifications right breast upper outer stable no significant change.  RIGHT BREAST ULTRASOUND  Follow-up calcifications in the right breast upper outer. There is no evidence of any solid mass or  abnormal cystic elements.  IMPRESSION:  Benign-Negative  BI-RADS Category 2: Benign    [facility] April the 18th 2017 bilateral screening mammogram with 3-D. Stable areas of asymmetric breast tissue. BI-RADS 2.    [facility] 4-23-18-  Bilateral screen with 3D- het dense- BR2    Procedures:      Assessment:   Diagnosis Plan   1. Breast calcifications on mammogram     2. Fibrocystic breast disease (FCBD), unspecified laterality     3. Papilloma of breast, unspecified laterality     4. FH: breast cancer       1-  RIGHT ANTERIOR TO POSTERIOR THIRD UOQ 9mm, right central - BR3 in 4-2015,  , and April 2016- BR2 in     2-  -RIGHT breast 5:00 calcifications- anterior 1/3 9mm- stereo 4-2015- usual hyperplasia, CCC, apocrine metasplasia and dilated ducts- hourglass marker  RIGHT breast LIQ, CCC, usual hyperplasia, sclerosing adenosis, and apocrine metasplasia- tophat marker    -RIGHT breast, central breast, 6:00, BR4 on 3-15-11mammogram; post stereo 4-4-11 benign proliferative and FCC with calcifications. 6 mo FU imaging benign 9-2011    3-  -LEFT breast papilloma 9:00- core at [facility]  4-2014 with complete removal,  Noexcision. LEFT US benign .    -  LEFT breast papilloma, focal radial scar.  Workup triggered by LEFT bloody nipple discharge, which prompted an MRI - this demonstrated a lesion which was biopsied and lead to an excisional biopsy. Exicisonal biopsy showed papilloma, columnar change and radial scar, no atypia. Followup imaging benign and nipple discharge resolved.      4-  Maternal cousin age 36- this cousin's mother (not of blood relation to Katheryn- had breast cancer and had BRCA testing that showed indeterminate variant per Katheryn's report; this cousin's father is Katheryn's blood uncle)      Plan:  Mrs. Andersen is doing well today at her interval follow-up visit.  We reviewed her interval history, imaging and imaging reports together.    Her examination is in good order.    Her  [facility]  I gave her this reminder today. In light of a benign exam and imaging, I have not given her a routine FU, but she knows to call with any concerns and we'd be happy to see her back.      I asked her to continue her self breast exam as well as her annual clinical exam.      Elba Urban MD              Next Appointment:  Return for any future concerns.

## 2018-05-15 RX ORDER — ZOLPIDEM TARTRATE 5 MG/1
TABLET ORAL
Qty: 90 TABLET | Refills: 2 | OUTPATIENT
Start: 2018-05-15

## 2019-04-18 ENCOUNTER — APPOINTMENT (OUTPATIENT)
Dept: WOMENS IMAGING | Facility: HOSPITAL | Age: 64
End: 2019-04-18

## 2019-04-18 PROCEDURE — 77063 BREAST TOMOSYNTHESIS BI: CPT | Performed by: RADIOLOGY

## 2019-04-18 PROCEDURE — 77067 SCR MAMMO BI INCL CAD: CPT | Performed by: RADIOLOGY

## 2020-05-27 ENCOUNTER — APPOINTMENT (OUTPATIENT)
Dept: WOMENS IMAGING | Facility: HOSPITAL | Age: 65
End: 2020-05-27

## 2020-05-27 PROCEDURE — 77067 SCR MAMMO BI INCL CAD: CPT | Performed by: RADIOLOGY

## 2020-05-27 PROCEDURE — 77063 BREAST TOMOSYNTHESIS BI: CPT | Performed by: RADIOLOGY

## 2021-03-22 ENCOUNTER — BULK ORDERING (OUTPATIENT)
Dept: CASE MANAGEMENT | Facility: OTHER | Age: 66
End: 2021-03-22

## 2021-03-22 DIAGNOSIS — Z23 IMMUNIZATION DUE: ICD-10-CM

## 2021-07-21 ENCOUNTER — APPOINTMENT (OUTPATIENT)
Dept: WOMENS IMAGING | Facility: HOSPITAL | Age: 66
End: 2021-07-21

## 2021-07-21 PROCEDURE — 77067 SCR MAMMO BI INCL CAD: CPT | Performed by: RADIOLOGY

## 2021-07-21 PROCEDURE — 77063 BREAST TOMOSYNTHESIS BI: CPT | Performed by: RADIOLOGY

## 2021-09-29 ENCOUNTER — TELEPHONE (OUTPATIENT)
Dept: SURGERY | Facility: CLINIC | Age: 66
End: 2021-09-29

## 2021-09-29 NOTE — TELEPHONE ENCOUNTER
New patient appointment with Miesha Clemons NP is scheduled on 2/22/2022 @ 2:30pm.    Called and left message with patient about appointment time, patient to call back and confirm.    Sent patient a reminder letter in the mail.

## 2021-11-02 ENCOUNTER — OFFICE VISIT (OUTPATIENT)
Dept: SURGERY | Facility: CLINIC | Age: 66
End: 2021-11-02

## 2021-11-02 VITALS
HEIGHT: 66 IN | OXYGEN SATURATION: 95 % | RESPIRATION RATE: 17 BRPM | BODY MASS INDEX: 27.8 KG/M2 | DIASTOLIC BLOOD PRESSURE: 76 MMHG | SYSTOLIC BLOOD PRESSURE: 118 MMHG | WEIGHT: 173 LBS | HEART RATE: 69 BPM

## 2021-11-02 DIAGNOSIS — N60.12 FIBROCYSTIC BREAST CHANGES, BILATERAL: ICD-10-CM

## 2021-11-02 DIAGNOSIS — N60.11 FIBROCYSTIC BREAST CHANGES, BILATERAL: ICD-10-CM

## 2021-11-02 DIAGNOSIS — R92.2 BREAST DENSITY: ICD-10-CM

## 2021-11-02 DIAGNOSIS — N64.4 MASTODYNIA: Primary | ICD-10-CM

## 2021-11-02 PROCEDURE — 99205 OFFICE O/P NEW HI 60 MIN: CPT | Performed by: NURSE PRACTITIONER

## 2021-11-02 RX ORDER — PROPRANOLOL HCL 60 MG
1 CAPSULE, EXTENDED RELEASE 24HR ORAL DAILY
COMMUNITY
Start: 2021-10-27

## 2021-11-02 RX ORDER — ROSUVASTATIN CALCIUM 20 MG/1
20 TABLET, COATED ORAL DAILY
COMMUNITY
Start: 2021-09-22 | End: 2021-11-02 | Stop reason: HOSPADM

## 2021-11-02 RX ORDER — BUPROPION HYDROCHLORIDE 150 MG/1
150 TABLET ORAL DAILY
COMMUNITY

## 2021-11-02 RX ORDER — GEMFIBROZIL 600 MG/1
600 TABLET, FILM COATED ORAL
COMMUNITY
Start: 2021-04-30 | End: 2022-04-30

## 2021-11-02 RX ORDER — ESTRADIOL 1 MG/1
TABLET ORAL
COMMUNITY
Start: 2021-08-09

## 2021-11-02 NOTE — PROGRESS NOTES
[facility]     Referring Provider: Lyssa Otoole MD     Chief complaint: Breast pain     HPI: Ms. Katheryn Andersen is a 67 yo woman, seen at the request of Lyssa Otoole MD, for mastodynia.     I personally reviewed her records and summarized her relevant breast history/imaging:    She has a personal history of bening right breast biopsy in 1/2010, left breast benign excisional biopsy for papilloma radial scar in 2/2010 by Dr Urban, benign right stereotactic biopsy in 4/2011, right benign excisional biopsy for papilloma, radial scar in  5/2012       She denies any family history of breast or ovarian cancer.     Her last clinic visit with Dr Urban was in 4/2018, her imaging and exam were stable with routine follow up recommended.    7/21/21: clinic visit with Lyssa Otoole MD  Presents for periodic follow-up exam.  Breast exam normal    4/18/2019: Screening mammogram with tomosynthesis at [facility]  Breasts are heterogeneously dense.  There are stable areas of asymmetric breast tissue associated postsurgical scar seen in both breasts.  Scattered stable calcifications are noted.  Biopsy clips noted.  No suspicious architectural distortions are identified.  Impression:  Stable areas of asymmetric breast tissue in both breasts are benign negative.  Screening mammogram 1 year is recommended.  BI-RADS Category 2    5/27/2020: Screening mammogram with tomosynthesis at [facility]  Breasts are heterogeneously dense.  There are stable areas of asymmetric breast tissue associated postsurgical scar seen in both breasts.  Scattered stable calcifications are noted.  Biopsy clips noted.  No suspicious masses, microcalcifications, or new architectural distortions are identified.  Impression:  Stable areas of asymmetric breast tissue in both breasts are benign negative.  Screening mammogram 1 year is recommended.  BI-RADS Category 2    7/21/2021: [facility]  Breasts are heterogeneously dense.  Finding 1: There is stable postsurgical scar seen in the subareolar region of the left breast.  There are no suspicious masses, calcifications, or areas of architectural distortion.  Finding 2: Parenchyma include stable nodular asymmetries.  Numerous biopsy clips noted.  No suspicious masses, suspicious microcalcifications or areas of architectural distortion are identified.  Impression:  Finding 1: Stable postsurgical scar left breast is benign negative.  Finding 2: Findings in both breasts are benign negative.  Screening mammogram 1 year is recommended.  BI-RADS Category 2    Today she presents with reports of bilateral breast discomfort that she notices nearly everyday.  It most commonly occurs in the morning along the lateral aspects of both breasts, also she is very tender with any palpation of the breasts.  She denies any breast lumps, skin changes, or nipple discharge.  Of note, about a year ago, she was prescribed estradiol 1mg PO for vaginal dryness.  After starting to take the hormone, she noticed vague breast discomfort that has progressed in frequency and severity.       She was by herself in clinic today.     Review of Systems   Constitutional: Negative for appetite change, chills, diaphoresis, fatigue, fever and unexpected weight change.   HENT:   Negative for hearing loss, lump/mass, mouth sores, nosebleeds, sore throat, tinnitus, trouble swallowing and voice change.    Eyes: Negative for eye problems and icterus.   Respiratory: Negative for chest tightness, cough, hemoptysis, shortness of breath and wheezing.    Cardiovascular: Negative for chest pain, leg swelling and palpitations.   Gastrointestinal: Negative for abdominal distention, abdominal pain, blood in stool, constipation, diarrhea, nausea, rectal pain and vomiting.   Endocrine: Negative for hot flashes.   Genitourinary: Negative for bladder incontinence, difficulty urinating, dyspareunia,  dysuria, frequency, hematuria, menstrual problem, nocturia, pelvic pain, vaginal bleeding and vaginal discharge.    Musculoskeletal: Negative for arthralgias, back pain, flank pain, gait problem, myalgias, neck pain and neck stiffness.   Skin: Negative for itching, rash and wound.   Neurological: Negative for dizziness, extremity weakness, gait problem, headaches, light-headedness, numbness, seizures and speech difficulty.   Hematological: Negative for adenopathy. Does not bruise/bleed easily.   Psychiatric/Behavioral: Negative for confusion, decreased concentration, depression, sleep disturbance and suicidal ideas. The patient is not nervous/anxious.      Medications:    Current Outpatient Medications:   •  ALPRAZolam (XANAX) 0.5 MG tablet, take 1 tablet by mouth at bedtime if needed for anxiety, Disp: 30 tablet, Rfl: 1  •  aspirin 81 MG tablet, Take by mouth daily., Disp: , Rfl:   •  conjugated estrogens (PREMARIN) 0.625 MG/GM vaginal cream, Insert  into the vagina Daily., Disp: 30 g, Rfl: 2  •  FLONASE ALLERGY RELIEF 50 MCG/ACT nasal spray, instill 2 sprays into each nostril once daily, Disp: , Rfl: 0  •  ibuprofen (ADVIL,MOTRIN) 400 MG tablet, , Disp: , Rfl: 0  •  lisinopril-hydrochlorothiazide (PRINZIDE,ZESTORETIC) 10-12.5 MG per tablet, take 1 tablet by mouth once daily, Disp: 90 tablet, Rfl: 1  •  loratadine-pseudoephedrine (CLARITIN-D 24 HOUR)  MG per 24 hr tablet, Take 1 tablet by mouth Daily. Use generic if available, Disp: 90 tablet, Rfl: 1  •  simvastatin (ZOCOR) 40 MG tablet, Take 1 tablet by mouth Every Night., Disp: 90 tablet, Rfl: 1  •  traZODone (DESYREL) 100 MG tablet, take 1 tablet by mouth at bedtime, Disp: 90 tablet, Rfl: 1  •  TRINTELLIX 10 MG tablet, , Disp: , Rfl: 0  •  zolpidem (AMBIEN) 5 MG tablet, take 1 tablet by mouth at bedtime if needed for sleep, Disp: 90 tablet, Rfl: 0    Allergies:  Allergies   Allergen Reactions   • Other        Medical history:  Past Medical History:    Diagnosis Date   • Hyperlipidemia    • Hypertension    • Insomnia        Surgical History:  Past Surgical History:   Procedure Laterality Date   • FOOT ARTHROPLASTY     • HYSTERECTOMY     • REFRACTIVE SURGERY         Family History:  Family History   Problem Relation Age of Onset   • Diabetes Mother    • Hypertension Mother    • Hyperlipidemia Mother    • COPD Father    • Hypertension Father    • Hyperlipidemia Father    • Diabetes Sister    • Mental retardation Sister    • Stroke Maternal Grandmother    • Stroke Paternal Grandmother    • Diabetes Paternal Grandmother    • Breast cancer Cousin 36        MATERNAL 1ST COUSIN    • Colon cancer Maternal Uncle         70S   • Colon cancer Maternal Uncle         70S   • Colon cancer Cousin         MATERNAL COUSIN   • Colon cancer Cousin         MATERNAL COUSIN   • Colon cancer Cousin         MATERNAL COUSIN       Social History:   Social History     Socioeconomic History   • Marital status:    Tobacco Use   • Smoking status: Never Smoker   • Smokeless tobacco: Never Used   Substance and Sexual Activity   • Alcohol use: Yes     Comment: social   • Drug use: No   • Sexual activity: Defer     Patient drinks 1. servings of caffeine per day.     GYNECOLOGIC HISTORY:   . P: 2. AB: 0.  Last menstrual period:   Age at menarche: 13  Age at first childbirth: 28  Lactation/How long: n/a  Age at menopause: Hysterectomy   Total years of oral contraceptive use: 12  Total years of hormone replacement therapy: 6-7 months        Physical Exam  Vitals:    21 1437   BP: 118/76   Pulse: 69   Resp: 17   SpO2: 95%     ECOG 0 - Asymptomatic  General: NAD, well appearing  Psych: a&o x 3, normal mood and affect  Eyes: EOMI, no scleral icterus  ENMT: neck supple without masses or thyromegaly, mucus membranes moist  Resp: normal effort, CTAB  CV: RRR, no murmurs, no edema   GI: soft, NT, ND  MSK: normal gait, normal ROM in bilateral shoulders  Lymph nodes:  no cervical,  supraclavicular or axillary lymphadenopathy  Breast: symmetric, moderate volume with mild ptosis bilaterally  Right:  No visible abnormalities on inspection while seated, with arms raised or hands on hips. No masses, skin changes, or nipple abnormalities. Mild tenderness with exam.  Left:  No visible abnormalities on inspection while seated, with arms raised or hands on hips. No masses, skin changes, or nipple abnormalities.  An area of ecchymosis is noted LOQ, uncertain of source of the trauma, she says she bruises easily  Mild tenderness with exam.      Assessment:    1)  66 y.o. F with a new bilateral mastodynia    2)  Benign breast changes    3)  Breast density  Heterogeneously dense in recent imaging        Discussion:  1)  We discussed breast pain and how it can sometimes be difficult to treat. We discussed that this is often related to hormonal changes. The timing of her breast pain seems to correlate with the timing of her initiation of oral hormones about a year ago.  I see in outside records, estradiol in her medication record beginning in mid 2021, she recalls starting it about a year ago.    I advised her to try taking the estradiol every other day and discuss transitioning to vaginal estrogen for her vaginal dryness when she returns to her prescriber, KE Telles, later this month.    We also discussed applying intravaginal coconut oil daily for continuous lubrication to prevent/minimize vaginal dryness     Caffeine and nicotine can also play a role in breast pain, and I encouraged her to continue only moderate caffeine consumption and continue avoiding nicotine. We also discussed the importance of wearing a good supportive bra. She can try wearing a sports bra during the day. She also can try sleeping in a sports bra or tight camisole. This may be helpful for her since she notices the discomfort when she awakens in the AM.    We discussed additional therapies such as vitamin E supplementation and  that this may or may not be useful. We also discussed using OTC tylenol or ibuprofen and/or topical ice or heat for prn pain control.     She she returns for exam in 3 months, I will re-evaluate her discomfort and if it continues, we will complete diagnostic imaging.    2)  We discussed fibrocystic change and how this is benign and can sometimes be associated with increased breast density. I reassured her today that she had a normal clinical breast exam and recent normal bilateral imaging. I explained that cords of dense breast tissue or focal areas of fibrocystic change can sometimes feel like a lump on exam. This is common in women like her with heterogeneous and nodular tissue. I encouraged her to become familiar with her own self-exam over the next few months to establish a personal baseline.    3) Her breast tissue in recent imaging is heterogeneously dense.  Breast density describes how the breasts look on a mammogram.  Breast and connective tissue are denser than fat and this difference shows up on the mammogram.  Young women often have dense breasts.  As we age, breast become less dense.  Dense breast can make it harder to find breast cancer on the mammogram.  Women with high breast density have an increased risk of breast cancer.  Educational materials regarding breast density were given and reviewed.  Tomosynthesis imaging will be completed with next screening study.       Plan:  1. Wear sports bras during the day when possible. Wear sports bra or tight camisole at night while sleeping.   2. Take Vitamin E, 200 U, twice daily.   3. Continue to reduce caffeine intake.   4. May use OTC tylenol or ibuprofen, or topical heat or ice, for pain control.   5. Decrease oral estrogen, transition to vaginal hormones for vaginal symptoms    - exam in 3 months    I have advised the patient to continue monthly breast self examination and to return to see me in 3 months.  She was also advised to notify us if she develops  new breast symptoms or continuation of current breast pain.       Miesha Clemons, KE      I spent 62 minutes caring for Ms Andersen on this date of service. This time includes time spent by me in the following activities: preparing for the visit, reviewing tests, obtaining and/or reviewing a separately obtained history, performing a medically appropriate examination and/or evaluation , counseling and educating the patient/family/caregiver and documenting information in the medical record.      CC:  MD Gene Rodriguez, Marcello DUARTE MD    EMR Dragon/transcription disclaimer:  Dictated using Dragon dictation

## 2022-11-17 VITALS
SYSTOLIC BLOOD PRESSURE: 117 MMHG | RESPIRATION RATE: 15 BRPM | OXYGEN SATURATION: 94 % | RESPIRATION RATE: 12 BRPM | TEMPERATURE: 97.2 F | DIASTOLIC BLOOD PRESSURE: 53 MMHG | DIASTOLIC BLOOD PRESSURE: 52 MMHG | SYSTOLIC BLOOD PRESSURE: 104 MMHG | SYSTOLIC BLOOD PRESSURE: 147 MMHG | HEART RATE: 68 BPM | HEART RATE: 73 BPM | HEART RATE: 66 BPM | OXYGEN SATURATION: 91 % | OXYGEN SATURATION: 95 % | OXYGEN SATURATION: 89 % | SYSTOLIC BLOOD PRESSURE: 100 MMHG | DIASTOLIC BLOOD PRESSURE: 70 MMHG | OXYGEN SATURATION: 84 % | HEART RATE: 69 BPM | RESPIRATION RATE: 16 BRPM | DIASTOLIC BLOOD PRESSURE: 78 MMHG | DIASTOLIC BLOOD PRESSURE: 50 MMHG | SYSTOLIC BLOOD PRESSURE: 101 MMHG | RESPIRATION RATE: 19 BRPM | DIASTOLIC BLOOD PRESSURE: 60 MMHG | SYSTOLIC BLOOD PRESSURE: 132 MMHG | DIASTOLIC BLOOD PRESSURE: 72 MMHG | TEMPERATURE: 96.1 F | RESPIRATION RATE: 14 BRPM | SYSTOLIC BLOOD PRESSURE: 114 MMHG | RESPIRATION RATE: 20 BRPM | SYSTOLIC BLOOD PRESSURE: 115 MMHG | OXYGEN SATURATION: 96 % | DIASTOLIC BLOOD PRESSURE: 84 MMHG | HEART RATE: 70 BPM | OXYGEN SATURATION: 92 % | SYSTOLIC BLOOD PRESSURE: 108 MMHG | WEIGHT: 175 LBS | HEART RATE: 74 BPM | OXYGEN SATURATION: 88 % | HEIGHT: 66 IN | HEART RATE: 67 BPM | RESPIRATION RATE: 11 BRPM

## 2022-11-28 ENCOUNTER — AMBULATORY SURGICAL CENTER (OUTPATIENT)
Dept: URBAN - METROPOLITAN AREA SURGERY 17 | Facility: SURGERY | Age: 67
End: 2022-11-28
Payer: MEDICARE

## 2022-11-28 ENCOUNTER — OFFICE (OUTPATIENT)
Dept: URBAN - METROPOLITAN AREA PATHOLOGY 4 | Facility: PATHOLOGY | Age: 67
End: 2022-11-28
Payer: MEDICARE

## 2022-11-28 DIAGNOSIS — D12.3 BENIGN NEOPLASM OF TRANSVERSE COLON: ICD-10-CM

## 2022-11-28 DIAGNOSIS — D12.5 BENIGN NEOPLASM OF SIGMOID COLON: ICD-10-CM

## 2022-11-28 DIAGNOSIS — K63.5 POLYP OF COLON: ICD-10-CM

## 2022-11-28 DIAGNOSIS — Z83.71 FAMILY HISTORY OF COLONIC POLYPS: ICD-10-CM

## 2022-11-28 DIAGNOSIS — D12.2 BENIGN NEOPLASM OF ASCENDING COLON: ICD-10-CM

## 2022-11-28 DIAGNOSIS — Z86.010 PERSONAL HISTORY OF COLONIC POLYPS: ICD-10-CM

## 2022-11-28 LAB
GI HISTOLOGY: A. SELECT: (no result)
GI HISTOLOGY: B. SELECT: (no result)
GI HISTOLOGY: C. UNSPECIFIED: (no result)
GI HISTOLOGY: PDF REPORT: (no result)

## 2022-11-28 PROCEDURE — 45385 COLONOSCOPY W/LESION REMOVAL: CPT | Mod: PT | Performed by: INTERNAL MEDICINE

## 2022-11-28 PROCEDURE — 88305 TISSUE EXAM BY PATHOLOGIST: CPT | Performed by: INTERNAL MEDICINE

## 2022-11-28 NOTE — SERVICEHPINOTES
COLBY SWAIN  is a  67  female   who presents today for a  Colonoscopy   for   the indications listed below. The updated Patient Profile was reviewed prior to the procedure, in conjunction with the Physical Exam, including medical conditions, surgical procedures, medications, allergies, family history and social history. See Physical Exam time stamp below for date and time of HPI completion.Pre-operatively, I reviewed the indication(s) for the procedure, the risks of the procedure [including but not limited to: unexpected bleeding possibly requiring hospitalization and/or unplanned repeat procedures, perforation possibly requiring surgical treatment, missed lesions and complications of sedation/MAC (also explained by anesthesia staff)]. I have evaluated the patient for risks associated with the planned anesthesia and the procedure to be performed and find the patient an acceptable candidate for IV sedation.Multiple opportunities were provided for any questions or concerns, and all questions were answered satisfactorily before any anesthesia was administered. We will proceed with the planned procedure.br

## 2023-02-28 ENCOUNTER — OFFICE (OUTPATIENT)
Dept: URBAN - METROPOLITAN AREA CLINIC 76 | Facility: CLINIC | Age: 68
End: 2023-02-28

## 2023-02-28 VITALS
SYSTOLIC BLOOD PRESSURE: 110 MMHG | OXYGEN SATURATION: 97 % | HEART RATE: 83 BPM | HEIGHT: 66 IN | WEIGHT: 179 LBS | DIASTOLIC BLOOD PRESSURE: 88 MMHG

## 2023-02-28 DIAGNOSIS — R14.0 ABDOMINAL DISTENSION (GASEOUS): ICD-10-CM

## 2023-02-28 DIAGNOSIS — K59.09 OTHER CONSTIPATION: ICD-10-CM

## 2023-02-28 PROCEDURE — 99214 OFFICE O/P EST MOD 30 MIN: CPT | Performed by: INTERNAL MEDICINE

## 2023-02-28 RX ORDER — LUBIPROSTONE 8 UG/1
16 CAPSULE, GELATIN COATED ORAL
Qty: 180 | Refills: 3 | Status: COMPLETED
Start: 2023-02-28 | End: 2023-05-31

## 2023-05-31 ENCOUNTER — OFFICE (OUTPATIENT)
Dept: URBAN - METROPOLITAN AREA CLINIC 66 | Facility: CLINIC | Age: 68
End: 2023-05-31

## 2023-05-31 VITALS
HEIGHT: 66 IN | SYSTOLIC BLOOD PRESSURE: 121 MMHG | WEIGHT: 176 LBS | DIASTOLIC BLOOD PRESSURE: 69 MMHG | HEART RATE: 75 BPM

## 2023-05-31 DIAGNOSIS — K59.09 OTHER CONSTIPATION: ICD-10-CM

## 2023-05-31 DIAGNOSIS — N81.6 RECTOCELE: ICD-10-CM

## 2023-05-31 DIAGNOSIS — R15.2 FECAL URGENCY: ICD-10-CM

## 2023-05-31 PROCEDURE — 99214 OFFICE O/P EST MOD 30 MIN: CPT | Performed by: NURSE PRACTITIONER

## 2023-05-31 RX ORDER — LUBIPROSTONE 8 UG/1
CAPSULE, GELATIN COATED ORAL
Qty: 60 | Refills: 11 | Status: COMPLETED
Start: 2023-05-31 | End: 2023-06-05

## 2023-06-13 ENCOUNTER — OFFICE (OUTPATIENT)
Dept: URBAN - METROPOLITAN AREA CLINIC 51 | Facility: CLINIC | Age: 68
End: 2023-06-13
Payer: MEDICARE

## 2023-06-13 DIAGNOSIS — K62.89 OTHER SPECIFIED DISEASES OF ANUS AND RECTUM: ICD-10-CM

## 2023-06-13 DIAGNOSIS — N81.6 RECTOCELE: ICD-10-CM

## 2023-06-13 DIAGNOSIS — K59.09 OTHER CONSTIPATION: ICD-10-CM

## 2023-06-13 DIAGNOSIS — R15.2 FECAL URGENCY: ICD-10-CM

## 2023-06-13 PROCEDURE — 91122: CPT | Performed by: INTERNAL MEDICINE

## 2023-06-13 PROCEDURE — 91120: CPT | Mod: 59 | Performed by: INTERNAL MEDICINE
